# Patient Record
Sex: MALE | Race: BLACK OR AFRICAN AMERICAN | Employment: UNEMPLOYED | URBAN - NONMETROPOLITAN AREA
[De-identification: names, ages, dates, MRNs, and addresses within clinical notes are randomized per-mention and may not be internally consistent; named-entity substitution may affect disease eponyms.]

---

## 2020-06-18 ENCOUNTER — HOSPITAL ENCOUNTER (EMERGENCY)
Age: 30
Discharge: HOME | End: 2020-06-18

## 2020-06-18 VITALS
BODY MASS INDEX: 24.43 KG/M2 | HEART RATE: 101 BPM | DIASTOLIC BLOOD PRESSURE: 77 MMHG | SYSTOLIC BLOOD PRESSURE: 121 MMHG | OXYGEN SATURATION: 96 % | TEMPERATURE: 98.2 F | RESPIRATION RATE: 20 BRPM | WEIGHT: 152 LBS | HEIGHT: 66 IN

## 2020-06-18 PROCEDURE — 99282 EMERGENCY DEPT VISIT SF MDM: CPT

## 2020-06-18 RX ORDER — BUPIVACAINE HYDROCHLORIDE 5 MG/ML
30 INJECTION, SOLUTION EPIDURAL; INTRACAUDAL ONCE
Status: DISCONTINUED | OUTPATIENT
Start: 2020-06-18 | End: 2020-06-18 | Stop reason: HOSPADM

## 2020-06-18 RX ORDER — PENICILLIN V POTASSIUM 500 MG/1
500 TABLET ORAL 3 TIMES DAILY
Qty: 30 TABLET | Refills: 0 | Status: SHIPPED | OUTPATIENT
Start: 2020-06-18 | End: 2020-06-28

## 2020-06-18 RX ORDER — NAPROXEN 500 MG/1
500 TABLET ORAL 2 TIMES DAILY
Qty: 20 TABLET | Refills: 0 | Status: SHIPPED | OUTPATIENT
Start: 2020-06-18 | End: 2021-10-08

## 2020-06-18 RX ORDER — HYDROCODONE BITARTRATE AND ACETAMINOPHEN 5; 325 MG/1; MG/1
1 TABLET ORAL EVERY 6 HOURS PRN
Qty: 10 TABLET | Refills: 0 | Status: SHIPPED | OUTPATIENT
Start: 2020-06-18 | End: 2020-06-21

## 2020-06-18 RX ORDER — LIDOCAINE HYDROCHLORIDE 10 MG/ML
5 INJECTION, SOLUTION EPIDURAL; INFILTRATION; INTRACAUDAL; PERINEURAL ONCE
Status: DISCONTINUED | OUTPATIENT
Start: 2020-06-18 | End: 2020-06-18 | Stop reason: HOSPADM

## 2020-06-18 SDOH — HEALTH STABILITY: MENTAL HEALTH: HOW OFTEN DO YOU HAVE A DRINK CONTAINING ALCOHOL?: NEVER

## 2020-06-18 ASSESSMENT — ENCOUNTER SYMPTOMS: VOMITING: 0

## 2020-06-18 ASSESSMENT — PAIN SCALES - GENERAL
PAINLEVEL_OUTOF10: 10
PAINLEVEL_OUTOF10: 4

## 2020-06-18 ASSESSMENT — PAIN DESCRIPTION - ORIENTATION: ORIENTATION: LEFT

## 2020-06-18 ASSESSMENT — PAIN DESCRIPTION - PAIN TYPE: TYPE: ACUTE PAIN

## 2020-06-18 ASSESSMENT — PAIN DESCRIPTION - LOCATION: LOCATION: TEETH

## 2020-06-18 NOTE — ED NOTES
Dental block performed by Nitish Tadeo NP using bupivicane and lidocaine. Medications disposed of by NP after using before being scanned.      [facility]  06/18/20 7597

## 2021-02-22 PROCEDURE — U0004 COV-19 TEST NON-CDC HGH THRU: HCPCS | Performed by: NURSE PRACTITIONER

## 2021-05-09 ENCOUNTER — APPOINTMENT (OUTPATIENT)
Dept: CT IMAGING | Age: 31
End: 2021-05-09

## 2021-05-09 ENCOUNTER — HOSPITAL ENCOUNTER (EMERGENCY)
Age: 31
Discharge: HOME | End: 2021-05-09

## 2021-05-09 VITALS
BODY MASS INDEX: 24.43 KG/M2 | HEART RATE: 88 BPM | DIASTOLIC BLOOD PRESSURE: 71 MMHG | WEIGHT: 152 LBS | SYSTOLIC BLOOD PRESSURE: 107 MMHG | HEIGHT: 66 IN | OXYGEN SATURATION: 98 % | TEMPERATURE: 97.6 F | RESPIRATION RATE: 17 BRPM

## 2021-05-09 DIAGNOSIS — M54.10 RADICULOPATHY AFFECTING UPPER EXTREMITY: Primary | ICD-10-CM

## 2021-05-09 LAB
ALBUMIN SERPL-MCNC: 4 G/DL (ref 3.5–5.2)
ALP BLD-CCNC: 75 U/L (ref 40–130)
ALT SERPL-CCNC: 9 U/L (ref 5–41)
ANION GAP SERPL CALCULATED.3IONS-SCNC: 9 MMOL/L (ref 7–19)
AST SERPL-CCNC: 16 U/L (ref 5–40)
BILIRUB SERPL-MCNC: <0.2 MG/DL (ref 0.2–1.2)
BUN BLDV-MCNC: 16 MG/DL (ref 6–20)
CALCIUM SERPL-MCNC: 9 MG/DL (ref 8.6–10)
CHLORIDE BLD-SCNC: 106 MMOL/L (ref 98–111)
CO2: 26 MMOL/L (ref 22–29)
CREAT SERPL-MCNC: 0.9 MG/DL (ref 0.5–1.2)
GFR AFRICAN AMERICAN: >59
GFR NON-AFRICAN AMERICAN: >60
GLUCOSE BLD-MCNC: 98 MG/DL (ref 74–109)
POTASSIUM REFLEX MAGNESIUM: 4.3 MMOL/L (ref 3.5–5)
SODIUM BLD-SCNC: 141 MMOL/L (ref 136–145)
TOTAL PROTEIN: 7.1 G/DL (ref 6.6–8.7)

## 2021-05-09 PROCEDURE — 99283 EMERGENCY DEPT VISIT LOW MDM: CPT

## 2021-05-09 PROCEDURE — 72125 CT NECK SPINE W/O DYE: CPT

## 2021-05-09 PROCEDURE — 36415 COLL VENOUS BLD VENIPUNCTURE: CPT

## 2021-05-09 PROCEDURE — 70450 CT HEAD/BRAIN W/O DYE: CPT

## 2021-05-09 PROCEDURE — 80053 COMPREHEN METABOLIC PANEL: CPT

## 2021-05-09 RX ORDER — METHYLPREDNISOLONE 4 MG/1
TABLET ORAL
Qty: 1 KIT | Refills: 0 | Status: SHIPPED | OUTPATIENT
Start: 2021-05-09 | End: 2021-05-15

## 2021-05-09 ASSESSMENT — ENCOUNTER SYMPTOMS
APNEA: 0
EYE DISCHARGE: 0
SHORTNESS OF BREATH: 0
COUGH: 0
BACK PAIN: 0
COLOR CHANGE: 0
EYE ITCHING: 0
PHOTOPHOBIA: 0

## 2021-05-09 NOTE — ED PROVIDER NOTES
and dental problem. Eyes: Negative for photophobia, discharge and itching. Respiratory: Negative for apnea, cough and shortness of breath. Cardiovascular: Negative for chest pain. Musculoskeletal: Positive for arthralgias. Negative for back pain, gait problem, myalgias and neck pain. Skin: Negative for color change, pallor and rash. Neurological: Positive for weakness and numbness. Negative for dizziness, seizures and syncope. Psychiatric/Behavioral: Negative for agitation. The patient is not nervous/anxious. Except as noted above the remainder of the review of systems was reviewed and negative. PAST MEDICAL HISTORY   History reviewed. No pertinent past medical history. SURGICAL HISTORY       Past Surgical History:   Procedure Laterality Date    HAND SURGERY  2013/2014         CURRENT MEDICATIONS       Discharge Medication List as of 5/9/2021  8:42 PM      CONTINUE these medications which have NOT CHANGED    Details   naproxen (NAPROSYN) 500 MG tablet Take 1 tablet by mouth 2 times daily for 10 days, Disp-20 tablet, R-0Print             ALLERGIES     Patient has no known allergies. FAMILY HISTORY     History reviewed. No pertinent family history. SOCIAL HISTORY       Social History     Socioeconomic History    Marital status: Single     Spouse name: None    Number of children: None    Years of education: None    Highest education level: None   Occupational History    None   Social Needs    Financial resource strain: None    Food insecurity     Worry: None     Inability: None    Transportation needs     Medical: None     Non-medical: None   Tobacco Use    Smoking status: Current Some Day Smoker     Types: Cigarettes    Smokeless tobacco: Never Used   Substance and Sexual Activity    Alcohol use: Yes     Frequency: Never     Comment:  Occ    Drug use: Never    Sexual activity: None   Lifestyle    Physical activity     Days per week: None     Minutes per session: None    Stress: None   Relationships    Social connections     Talks on phone: None     Gets together: None     Attends Jew service: None     Active member of club or organization: None     Attends meetings of clubs or organizations: None     Relationship status: None    Intimate partner violence     Fear of current or ex partner: None     Emotionally abused: None     Physically abused: None     Forced sexual activity: None   Other Topics Concern    None   Social History Narrative    None       SCREENINGS           PHYSICAL EXAM    (up to 7 forlevel 4, 8 or more for level 5)     ED Triage Vitals [05/09/21 1804]   BP Temp Temp src Pulse Resp SpO2 Height Weight   101/75 97.6 °F (36.4 °C) -- 87 20 98 % 5' 6\" (1.676 m) 152 lb (68.9 kg)       Physical Exam  Vitals signs and nursing note reviewed. Constitutional:       General: He is not in acute distress. Appearance: Normal appearance. He is well-developed. He is not diaphoretic. HENT:      Head: Normocephalic and atraumatic. Right Ear: Tympanic membrane, ear canal and external ear normal.      Left Ear: Tympanic membrane, ear canal and external ear normal.      Nose: Nose normal.      Mouth/Throat:      Mouth: Mucous membranes are moist.   Eyes:      Pupils: Pupils are equal, round, and reactive to light. Neck:      Musculoskeletal: Normal range of motion and neck supple. Trachea: No tracheal deviation. Cardiovascular:      Rate and Rhythm: Normal rate and regular rhythm. Pulses: Normal pulses. Heart sounds: Normal heart sounds. No murmur. Pulmonary:      Effort: Pulmonary effort is normal.      Breath sounds: Normal breath sounds. No stridor. No wheezing. Chest:      Chest wall: No tenderness. Abdominal:      General: Abdomen is flat. Bowel sounds are normal. There is no distension. Palpations: Abdomen is soft. Tenderness: There is no abdominal tenderness. Musculoskeletal: Normal range of motion. General: Tenderness present. No signs of injury. Skin:     General: Skin is warm and dry. Capillary Refill: Capillary refill takes less than 2 seconds. Neurological:      General: No focal deficit present. Mental Status: He is alert and oriented to person, place, and time. Mental status is at baseline. Sensory: No sensory deficit. Motor: No weakness. Comments: At rest normal sensation   Psychiatric:         Mood and Affect: Mood normal.         Behavior: Behavior normal.         Thought Content: Thought content normal.         Judgment: Judgment normal.           DIAGNOSTIC RESULTS     RADIOLOGY:   Non-plain film images such as CT, Ultrasound and MRI are read by the radiologist. Plain radiographic images are visualized and preliminarilyinterpreted by No att. providers found with the below findings:      Interpretation per the Radiologist below, if available at the time of this note:    CT Head WO Contrast   Final Result   1. No acute intracranial abnormality is seen. Signed by Dr Deanna Tejada on 5/9/2021 7:20 PM      CT Cervical Spine WO Contrast   Final Result   1. No acute bony abnormality. Signed by Dr Deanna Tejada on 5/9/2021 7:22 PM          LABS:  Labs Reviewed   COMPREHENSIVE METABOLIC PANEL W/ REFLEX TO MG FOR LOW K       All other labs were within normal range or notreturned as of this dictation. RE-ASSESSMENT        EMERGENCY DEPARTMENT COURSE and DIFFERENTIAL DIAGNOSIS/MDM:   Vitals:    Vitals:    05/09/21 1804 05/09/21 2027   BP: 101/75 107/71   Pulse: 87 88   Resp: 20 17   Temp: 97.6 °F (36.4 °C)    SpO2: 98% 98%   Weight: 152 lb (68.9 kg)    Height: 5' 6\" (1.676 m)        MDM  Patient has no acute findings on head or neck. His calcium is within normal limits.   I will provide him with work excuse I given him number for [id number] care to follow-up on this issue would consider therapy it sounds like overuse mechanism it is made worse when doing repetitive movements and sleeping we feel it correlates with his neck. He has no weakness on my exam and the sensory issue is not present in ED. He has palpable pulses no vascular concerns warm extremities. No other issue this very localized. Patient states understanding we will work on discharge. PROCEDURES:    Procedures      FINAL IMPRESSION      1. Radiculopathy affecting upper extremity          DISPOSITION/PLAN   DISPOSITION Decision To Discharge 05/09/2021 08:41:59 PM      PATIENT REFERRED TO:  [address] [place], [place] [address]  [facility]  [phone]    If symptoms worsen    Luis E   Justus Ruben [id number] [phone]          DISCHARGE MEDICATIONS:  Discharge Medication List as of 5/9/2021  8:42 PM      START taking these medications    Details   methylPREDNISolone (MEDROL, DIALLO,) 4 MG tablet Take by mouth., Disp-1 kit, R-0Print             (Please note that portions of this note were completed with a voice recognition program.  Efforts were made to edit the dictations but occasionallywords are mis-transcribed.)    Richard Jones [address] [place] [address]  05/10/21 1610

## 2021-10-08 ENCOUNTER — HOSPITAL ENCOUNTER (EMERGENCY)
Facility: HOSPITAL | Age: 31
Discharge: LEFT BEFORE DISCHARGE | End: 2021-10-08
Admitting: EMERGENCY MEDICINE

## 2021-10-08 ENCOUNTER — APPOINTMENT (OUTPATIENT)
Dept: GENERAL RADIOLOGY | Facility: HOSPITAL | Age: 31
End: 2021-10-08

## 2021-10-08 ENCOUNTER — HOSPITAL ENCOUNTER (EMERGENCY)
Age: 31
Discharge: HOME | End: 2021-10-08
Attending: EMERGENCY MEDICINE
Payer: MEDICAID

## 2021-10-08 ENCOUNTER — HOSPITAL ENCOUNTER (EMERGENCY)
Facility: HOSPITAL | Age: 31
Discharge: LEFT BEFORE DISCHARGE | End: 2021-10-08

## 2021-10-08 ENCOUNTER — HOSPITAL ENCOUNTER (EMERGENCY)
Facility: HOSPITAL | Age: 31
Discharge: HOME | End: 2021-10-08
Admitting: EMERGENCY MEDICINE

## 2021-10-08 VITALS
BODY MASS INDEX: 24.43 KG/M2 | DIASTOLIC BLOOD PRESSURE: 70 MMHG | WEIGHT: 152 LBS | HEART RATE: 71 BPM | SYSTOLIC BLOOD PRESSURE: 91 MMHG | TEMPERATURE: 97.8 F | HEIGHT: 66 IN | RESPIRATION RATE: 20 BRPM | OXYGEN SATURATION: 96 %

## 2021-10-08 VITALS
HEIGHT: 65 IN | WEIGHT: 150 LBS | OXYGEN SATURATION: 100 % | HEART RATE: 98 BPM | SYSTOLIC BLOOD PRESSURE: 140 MMHG | DIASTOLIC BLOOD PRESSURE: 78 MMHG | TEMPERATURE: 98.1 F | BODY MASS INDEX: 24.99 KG/M2 | RESPIRATION RATE: 20 BRPM

## 2021-10-08 VITALS — TEMPERATURE: 98.4 F | HEIGHT: 65 IN | RESPIRATION RATE: 18 BRPM | BODY MASS INDEX: 21.63 KG/M2

## 2021-10-08 DIAGNOSIS — R07.9 CHEST PAIN, UNSPECIFIED TYPE: Primary | ICD-10-CM

## 2021-10-08 DIAGNOSIS — F15.10 METHAMPHETAMINE ABUSE (HCC): Primary | ICD-10-CM

## 2021-10-08 DIAGNOSIS — R41.0 DELIRIUM: Primary | ICD-10-CM

## 2021-10-08 DIAGNOSIS — F19.10 DRUG ABUSE (HCC): ICD-10-CM

## 2021-10-08 DIAGNOSIS — R82.5 POSITIVE URINE DRUG SCREEN: ICD-10-CM

## 2021-10-08 DIAGNOSIS — Z53.21 ELOPED FROM EMERGENCY DEPARTMENT: ICD-10-CM

## 2021-10-08 DIAGNOSIS — F12.10 MARIJUANA ABUSE: ICD-10-CM

## 2021-10-08 LAB
ALBUMIN SERPL-MCNC: 4.4 G/DL (ref 3.5–5.2)
ALBUMIN SERPL-MCNC: 4.5 G/DL (ref 3.5–5.2)
ALBUMIN/GLOB SERPL: 1.4 G/DL
ALP BLD-CCNC: 59 U/L (ref 40–130)
ALP SERPL-CCNC: 60 U/L (ref 39–117)
ALT SERPL W P-5'-P-CCNC: 11 U/L (ref 1–41)
ALT SERPL-CCNC: 14 U/L (ref 5–41)
AMPHET+METHAMPHET UR QL: POSITIVE
AMPHETAMINES UR QL: POSITIVE
ANION GAP SERPL CALCULATED.3IONS-SCNC: 10 MMOL/L (ref 7–19)
ANION GAP SERPL CALCULATED.3IONS-SCNC: 12 MMOL/L (ref 5–15)
AST SERPL-CCNC: 20 U/L (ref 1–40)
AST SERPL-CCNC: 21 U/L (ref 5–40)
BARBITURATES UR QL SCN: NEGATIVE
BASOPHILS # BLD AUTO: 0.04 10*3/MM3 (ref 0–0.2)
BASOPHILS ABSOLUTE: 0 K/UL (ref 0–0.2)
BASOPHILS NFR BLD AUTO: 0.4 % (ref 0–1.5)
BASOPHILS RELATIVE PERCENT: 0.3 % (ref 0–1)
BENZODIAZ UR QL SCN: NEGATIVE
BILIRUB SERPL-MCNC: 0.6 MG/DL (ref 0–1.2)
BILIRUB SERPL-MCNC: 0.9 MG/DL (ref 0.2–1.2)
BUN BLDV-MCNC: 16 MG/DL (ref 6–20)
BUN SERPL-MCNC: 13 MG/DL (ref 6–20)
BUN/CREAT SERPL: 14.6 (ref 7–25)
BUPRENORPHINE SERPL-MCNC: NEGATIVE NG/ML
CALCIUM SERPL-MCNC: 10 MG/DL (ref 8.6–10)
CALCIUM SPEC-SCNC: 9.7 MG/DL (ref 8.6–10.5)
CANNABINOIDS SERPL QL: POSITIVE
CHLORIDE BLD-SCNC: 102 MMOL/L (ref 98–111)
CHLORIDE SERPL-SCNC: 102 MMOL/L (ref 98–107)
CO2 SERPL-SCNC: 23 MMOL/L (ref 22–29)
CO2: 27 MMOL/L (ref 22–29)
COCAINE UR QL: NEGATIVE
CREAT SERPL-MCNC: 0.89 MG/DL (ref 0.76–1.27)
CREAT SERPL-MCNC: 1 MG/DL (ref 0.5–1.2)
DEPRECATED RDW RBC AUTO: 44.5 FL (ref 37–54)
EOSINOPHIL # BLD AUTO: 0 10*3/MM3 (ref 0–0.4)
EOSINOPHIL NFR BLD AUTO: 0 % (ref 0.3–6.2)
EOSINOPHILS ABSOLUTE: 0 K/UL (ref 0–0.6)
EOSINOPHILS RELATIVE PERCENT: 0.1 % (ref 0–5)
ERYTHROCYTE [DISTWIDTH] IN BLOOD BY AUTOMATED COUNT: 13.2 % (ref 12.3–15.4)
ETHANOL UR QL: <0.01 %
ETHANOL: <10 MG/DL (ref 0–0.08)
GFR AFRICAN AMERICAN: >59
GFR NON-AFRICAN AMERICAN: >60
GFR SERPL CREATININE-BSD FRML MDRD: 121 ML/MIN/1.73
GLOBULIN UR ELPH-MCNC: 3.1 GM/DL
GLUCOSE BLD-MCNC: 104 MG/DL (ref 74–109)
GLUCOSE SERPL-MCNC: 108 MG/DL (ref 65–99)
HCT VFR BLD AUTO: 45 % (ref 37.5–51)
HCT VFR BLD CALC: 46.6 % (ref 42–52)
HEMOGLOBIN: 15.8 G/DL (ref 14–18)
HGB BLD-MCNC: 15.4 G/DL (ref 13–17.7)
IMM GRANULOCYTES # BLD AUTO: 0.03 10*3/MM3 (ref 0–0.05)
IMM GRANULOCYTES NFR BLD AUTO: 0.3 % (ref 0–0.5)
IMMATURE GRANULOCYTES #: 0 K/UL
LYMPHOCYTES # BLD AUTO: 1.2 10*3/MM3 (ref 0.7–3.1)
LYMPHOCYTES ABSOLUTE: 1.8 K/UL (ref 1.1–4.5)
LYMPHOCYTES NFR BLD AUTO: 12.7 % (ref 19.6–45.3)
LYMPHOCYTES RELATIVE PERCENT: 17.9 % (ref 20–40)
MAGNESIUM: 1.9 MG/DL (ref 1.6–2.6)
MCH RBC QN AUTO: 31.1 PG (ref 26.6–33)
MCH RBC QN AUTO: 31.6 PG (ref 27–31)
MCHC RBC AUTO-ENTMCNC: 33.9 G/DL (ref 33–37)
MCHC RBC AUTO-ENTMCNC: 34.2 G/DL (ref 31.5–35.7)
MCV RBC AUTO: 90.9 FL (ref 79–97)
MCV RBC AUTO: 93.2 FL (ref 80–94)
METHADONE UR QL SCN: NEGATIVE
MONOCYTES # BLD AUTO: 0.79 10*3/MM3 (ref 0.1–0.9)
MONOCYTES ABSOLUTE: 0.9 K/UL (ref 0–0.9)
MONOCYTES NFR BLD AUTO: 8.4 % (ref 5–12)
MONOCYTES RELATIVE PERCENT: 9.4 % (ref 0–10)
NEUTROPHILS ABSOLUTE: 7.1 K/UL (ref 1.5–7.5)
NEUTROPHILS NFR BLD AUTO: 7.36 10*3/MM3 (ref 1.7–7)
NEUTROPHILS NFR BLD AUTO: 78.2 % (ref 42.7–76)
NEUTROPHILS RELATIVE PERCENT: 71.9 % (ref 50–65)
NRBC BLD AUTO-RTO: 0 /100 WBC (ref 0–0.2)
OPIATES UR QL: NEGATIVE
OXYCODONE UR QL SCN: NEGATIVE
PCP UR QL SCN: NEGATIVE
PDW BLD-RTO: 13 % (ref 11.5–14.5)
PLATELET # BLD AUTO: 197 10*3/MM3 (ref 140–450)
PLATELET # BLD: 184 K/UL (ref 130–400)
PMV BLD AUTO: 11.2 FL (ref 6–12)
PMV BLD AUTO: 11.2 FL (ref 9.4–12.4)
POTASSIUM REFLEX MAGNESIUM: 3.4 MMOL/L (ref 3.5–5)
POTASSIUM SERPL-SCNC: 3.4 MMOL/L (ref 3.5–5.2)
PRO-BNP: 14 PG/ML (ref 0–450)
PROPOXYPH UR QL: NEGATIVE
PROT SERPL-MCNC: 7.5 G/DL (ref 6–8.5)
RBC # BLD AUTO: 4.95 10*6/MM3 (ref 4.14–5.8)
RBC # BLD: 5 M/UL (ref 4.7–6.1)
SODIUM BLD-SCNC: 139 MMOL/L (ref 136–145)
SODIUM SERPL-SCNC: 137 MMOL/L (ref 136–145)
TOTAL PROTEIN: 7.9 G/DL (ref 6.6–8.7)
TRICYCLICS UR QL SCN: NEGATIVE
TROPONIN T SERPL-MCNC: <0.01 NG/ML (ref 0–0.03)
TROPONIN: <0.01 NG/ML (ref 0–0.03)
WBC # BLD AUTO: 9.42 10*3/MM3 (ref 3.4–10.8)
WBC # BLD: 9.9 K/UL (ref 4.8–10.8)

## 2021-10-08 PROCEDURE — 82077 ASSAY SPEC XCP UR&BREATH IA: CPT | Performed by: NURSE PRACTITIONER

## 2021-10-08 PROCEDURE — 36415 COLL VENOUS BLD VENIPUNCTURE: CPT

## 2021-10-08 PROCEDURE — 80306 DRUG TEST PRSMV INSTRMNT: CPT | Performed by: NURSE PRACTITIONER

## 2021-10-08 PROCEDURE — 82077 ASSAY SPEC XCP UR&BREATH IA: CPT

## 2021-10-08 PROCEDURE — 93005 ELECTROCARDIOGRAM TRACING: CPT | Performed by: NURSE PRACTITIONER

## 2021-10-08 PROCEDURE — 99284 EMERGENCY DEPT VISIT MOD MDM: CPT

## 2021-10-08 PROCEDURE — 83880 ASSAY OF NATRIURETIC PEPTIDE: CPT

## 2021-10-08 PROCEDURE — 6360000002 HC RX W HCPCS: Performed by: EMERGENCY MEDICINE

## 2021-10-08 PROCEDURE — 85025 COMPLETE CBC W/AUTO DIFF WBC: CPT

## 2021-10-08 PROCEDURE — 71045 X-RAY EXAM CHEST 1 VIEW: CPT

## 2021-10-08 PROCEDURE — 99211 OFF/OP EST MAY X REQ PHY/QHP: CPT

## 2021-10-08 PROCEDURE — 99281 EMR DPT VST MAYX REQ PHY/QHP: CPT

## 2021-10-08 PROCEDURE — 96374 THER/PROPH/DIAG INJ IV PUSH: CPT

## 2021-10-08 PROCEDURE — 85025 COMPLETE CBC W/AUTO DIFF WBC: CPT | Performed by: NURSE PRACTITIONER

## 2021-10-08 PROCEDURE — 93010 ELECTROCARDIOGRAM REPORT: CPT | Performed by: INTERNAL MEDICINE

## 2021-10-08 PROCEDURE — 99283 EMERGENCY DEPT VISIT LOW MDM: CPT

## 2021-10-08 PROCEDURE — 80053 COMPREHEN METABOLIC PANEL: CPT

## 2021-10-08 PROCEDURE — 80053 COMPREHEN METABOLIC PANEL: CPT | Performed by: NURSE PRACTITIONER

## 2021-10-08 PROCEDURE — 83735 ASSAY OF MAGNESIUM: CPT

## 2021-10-08 PROCEDURE — 84484 ASSAY OF TROPONIN QUANT: CPT | Performed by: NURSE PRACTITIONER

## 2021-10-08 PROCEDURE — 84484 ASSAY OF TROPONIN QUANT: CPT

## 2021-10-08 RX ORDER — SODIUM CHLORIDE 0.9 % (FLUSH) 0.9 %
10 SYRINGE (ML) INJECTION AS NEEDED
Status: DISCONTINUED | OUTPATIENT
Start: 2021-10-08 | End: 2021-10-08 | Stop reason: HOSPADM

## 2021-10-08 RX ORDER — LORAZEPAM 2 MG/ML
2 INJECTION INTRAMUSCULAR ONCE
Status: COMPLETED | OUTPATIENT
Start: 2021-10-08 | End: 2021-10-08

## 2021-10-08 RX ADMIN — LORAZEPAM 2 MG: 2 INJECTION INTRAMUSCULAR; INTRAVENOUS at 15:39

## 2021-10-08 ASSESSMENT — PAIN SCALES - GENERAL: PAINLEVEL_OUTOF10: 3
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
## 2021-10-08 NOTE — ED NOTES
Alicia Mcgrath at triage desk and request patient to be brought straight back to speak with patient.      Rayne Oliveira RN  10/08/21 2485
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
## 2021-10-08 NOTE — ED PROVIDER NOTES
"Subjective   Patient is a 31-year-old male presents the emergency department with complaints of \"I believe I have been poisoned.\"  He was seen earlier by this provider however patient got up and eloped from the emergency department.  He appears to be under the influence of a substance.  He does not make much sense on exam.  He complains of memory loss and chest pain and nausea however does not stay focused and answer questions appropriately.  He is somewhat agitated. We have needed security in the room while we are examining the patient.  Earlier he continued to get up and walk outside the room into the hallway and he was noted to hit the walls.  He is tearful at times.  Currently he is staying in the bed and allowing us to do a work-up on him.  Limited information given his status.          Review of Systems   Constitutional: Negative.  Negative for fever.   HENT: Negative.  Negative for congestion.    Eyes: Negative.    Respiratory: Positive for shortness of breath. Negative for cough.    Cardiovascular: Positive for chest pain.   Gastrointestinal: Positive for nausea. Negative for abdominal pain, constipation, diarrhea and vomiting.   Genitourinary: Negative.    Musculoskeletal: Negative.    Skin: Negative.    Psychiatric/Behavioral: Positive for agitation. Negative for hallucinations. The patient is nervous/anxious.    All other systems reviewed and are negative.      No past medical history on file.    No Known Allergies    Past Surgical History:   Procedure Laterality Date   • HAND SURGERY         No family history on file.    Social History     Socioeconomic History   • Marital status: Single     Spouse name: Not on file   • Number of children: Not on file   • Years of education: Not on file   • Highest education level: Not on file   Tobacco Use   • Smoking status: Current Every Day Smoker     Packs/day: 0.50     Types: Cigarettes   • Smokeless tobacco: Never Used   Substance and Sexual Activity   • Alcohol " use: Never   • Drug use: Never           Objective   Physical Exam  Vitals and nursing note reviewed.   Constitutional:       General: He is not in acute distress.     Appearance: Normal appearance. He is well-developed. He is not diaphoretic.   HENT:      Head: Atraumatic.      Right Ear: External ear normal.      Left Ear: External ear normal.      Nose: Nose normal.      Mouth/Throat:      Mouth: Mucous membranes are moist.      Pharynx: Oropharynx is clear.   Eyes:      General: No scleral icterus.     Extraocular Movements: Extraocular movements intact.      Conjunctiva/sclera: Conjunctivae normal.      Pupils: Pupils are equal, round, and reactive to light.   Neck:      Thyroid: No thyromegaly.      Vascular: No JVD.   Cardiovascular:      Rate and Rhythm: Normal rate and regular rhythm.      Heart sounds: Normal heart sounds. No murmur heard.     Pulmonary:      Effort: Pulmonary effort is normal. No respiratory distress.      Breath sounds: Normal breath sounds. No wheezing or rales.   Chest:      Chest wall: No tenderness.   Abdominal:      General: Bowel sounds are normal. There is no distension.      Palpations: Abdomen is soft. There is no mass.      Tenderness: There is no abdominal tenderness. There is no guarding or rebound.   Musculoskeletal:         General: Normal range of motion.      Cervical back: Normal range of motion and neck supple.   Lymphadenopathy:      Cervical: No cervical adenopathy.   Skin:     General: Skin is warm and dry.      Coloration: Skin is not pale.      Findings: No erythema or rash.   Neurological:      Mental Status: He is alert and oriented to person, place, and time.      Cranial Nerves: No cranial nerve deficit.      Coordination: Coordination normal.      Deep Tendon Reflexes: Reflexes are normal and symmetric.   Psychiatric:      Comments: Patient is alert and oriented, he appears to be under the influence of a substance on exam, he is tearful stating he believes  someone has poisoned him, he does not make much sense on exam, difficult to get him to answer questions appropriately         Procedures           ED Course  ED Course as of Oct 08 1520   Fri Oct 08, 2021   1142 Patient has required security in his room at all times.  He has been standing up in the room and being somewhat disruptive with the security.  His urine drug screen is positive for methamphetamines, amphetamines, and THC.  His labs are otherwise unremarkable with a normal troponin at less than 0.010.  Blood alcohol level of less than 0.010.  CBC is unremarkable, CMP is unremarkable as well.  We will discharge patient now in stable condition.  He will need to discontinue illegal drugs.    [TW]      ED Course User Index  [TW] Alicia Keen, AMINA                                           MDM  Number of Diagnoses or Management Options  Drug abuse (HCC): new and requires workup  Marijuana abuse: new and requires workup  Methamphetamine abuse (HCC): new and requires workup     Amount and/or Complexity of Data Reviewed  Clinical lab tests: ordered and reviewed  Tests in the radiology section of CPT®: ordered and reviewed  Discuss the patient with other providers: yes    Risk of Complications, Morbidity, and/or Mortality  Presenting problems: moderate  Diagnostic procedures: moderate  Management options: moderate    Patient Progress  Patient progress: improved      Final diagnoses:   Methamphetamine abuse (HCC)   Marijuana abuse   Drug abuse (HCC)       ED Disposition  ED Disposition     ED Disposition Condition Comment    Discharge Good           No follow-up provider specified.       Medication List      No changes were made to your prescriptions during this visit.          Alicia Keen, AMINA  10/08/21 152

## 2021-10-08 NOTE — ED NOTES
Patient assisted with sign in sheet per security. Ay this time it was noted that patient did not include a birth date or social security number. Patient was asked to verify one or the other so he could be found in computer system. At this time patient started saying that he was poised by drinking water. Charge nurse was notified of patient and patient behavior at triage . Charge nurse was aware of Patient  due to earlier visit. Patient name and birthday along with s.s number provided per charge nurse. Alicia Keen provider notified per charge nurse due to caring for patient earlier in the morning.      Rayne Oliveira, RN  10/08/21 2623

## 2021-10-08 NOTE — Clinical Note
Bette Weber was seen and treated in our emergency department on 10/8/2021. He may return to work on 10/11/2021. If you have any questions or concerns, please don't hesitate to call.       Maru Pham MD

## 2021-10-08 NOTE — ED NOTES
Patient signed in and heard making commotion. Security called stating that patient had left with out being seen. Triage not yet preformed.        Rayne Oliveira RN  10/08/21 0238

## 2021-10-08 NOTE — Clinical Note
Jaclyn العلي was seen and treated in our emergency department on 10/8/2021. He may return to work on 10/11/2021. If you have any questions or concerns, please don't hesitate to call.       Vitaly Armas MD

## 2021-10-10 LAB
EKG P AXIS: 3 DEGREES
EKG P-R INTERVAL: 136 MS
EKG Q-T INTERVAL: 332 MS
EKG QRS DURATION: 88 MS
EKG QTC CALCULATION (BAZETT): 419 MS
EKG T AXIS: 8 DEGREES

## 2021-10-10 PROCEDURE — 93010 ELECTROCARDIOGRAM REPORT: CPT | Performed by: INTERNAL MEDICINE

## 2021-10-11 LAB
QT INTERVAL: 372 MS
QTC INTERVAL: 393 MS

## 2021-11-24 ENCOUNTER — HOSPITAL ENCOUNTER (EMERGENCY)
Age: 31
Discharge: HOME | End: 2021-11-24
Attending: PEDIATRICS
Payer: MEDICAID

## 2021-11-24 VITALS
OXYGEN SATURATION: 97 % | SYSTOLIC BLOOD PRESSURE: 128 MMHG | HEIGHT: 66 IN | HEART RATE: 121 BPM | WEIGHT: 132 LBS | RESPIRATION RATE: 18 BRPM | TEMPERATURE: 97.8 F | DIASTOLIC BLOOD PRESSURE: 76 MMHG | BODY MASS INDEX: 21.21 KG/M2

## 2021-11-24 DIAGNOSIS — F19.90 SUBSTANCE USE DISORDER: Primary | ICD-10-CM

## 2021-11-24 LAB
ACETAMINOPHEN LEVEL: <15 UG/ML
ALBUMIN SERPL-MCNC: 4.4 G/DL (ref 3.5–5.2)
ALP BLD-CCNC: 65 U/L (ref 40–130)
ALT SERPL-CCNC: 15 U/L (ref 5–41)
AMPHETAMINE SCREEN, URINE: POSITIVE
ANION GAP SERPL CALCULATED.3IONS-SCNC: 12 MMOL/L (ref 7–19)
AST SERPL-CCNC: 22 U/L (ref 5–40)
BACTERIA: NEGATIVE /HPF
BARBITURATE SCREEN URINE: NEGATIVE
BASOPHILS ABSOLUTE: 0.1 K/UL (ref 0–0.2)
BASOPHILS RELATIVE PERCENT: 0.4 % (ref 0–1)
BENZODIAZEPINE SCREEN, URINE: NEGATIVE
BILIRUB SERPL-MCNC: 0.7 MG/DL (ref 0.2–1.2)
BILIRUBIN URINE: NEGATIVE
BLOOD, URINE: NEGATIVE
BUN BLDV-MCNC: 17 MG/DL (ref 6–20)
C. TRACHOMATIS DNA ,URINE: NEGATIVE
CALCIUM SERPL-MCNC: 9.2 MG/DL (ref 8.6–10)
CANNABINOID SCREEN URINE: POSITIVE
CHLORIDE BLD-SCNC: 100 MMOL/L (ref 98–111)
CLARITY: CLEAR
CO2: 24 MMOL/L (ref 22–29)
COCAINE METABOLITE SCREEN URINE: POSITIVE
COLOR: YELLOW
CREAT SERPL-MCNC: 1 MG/DL (ref 0.5–1.2)
CRYSTALS, UA: ABNORMAL /HPF
EOSINOPHILS ABSOLUTE: 0 K/UL (ref 0–0.6)
EOSINOPHILS RELATIVE PERCENT: 0.1 % (ref 0–5)
EPITHELIAL CELLS, UA: 2 /HPF (ref 0–5)
ETHANOL: <10 MG/DL (ref 0–0.08)
GFR AFRICAN AMERICAN: >59
GFR NON-AFRICAN AMERICAN: >60
GLUCOSE BLD-MCNC: 116 MG/DL (ref 74–109)
GLUCOSE URINE: NEGATIVE MG/DL
HCT VFR BLD CALC: 45.1 % (ref 42–52)
HEMOGLOBIN: 14.8 G/DL (ref 14–18)
HYALINE CASTS: 23 /HPF (ref 0–8)
IMMATURE GRANULOCYTES #: 0.1 K/UL
KETONES, URINE: 40 MG/DL
LEUKOCYTE ESTERASE, URINE: ABNORMAL
LYMPHOCYTES ABSOLUTE: 1.8 K/UL (ref 1.1–4.5)
LYMPHOCYTES RELATIVE PERCENT: 14.4 % (ref 20–40)
Lab: ABNORMAL
MCH RBC QN AUTO: 30.9 PG (ref 27–31)
MCHC RBC AUTO-ENTMCNC: 32.8 G/DL (ref 33–37)
MCV RBC AUTO: 94.2 FL (ref 80–94)
MONOCYTES ABSOLUTE: 1.2 K/UL (ref 0–0.9)
MONOCYTES RELATIVE PERCENT: 9.6 % (ref 0–10)
N. GONORRHOEAE DNA, URINE: NEGATIVE
NEUTROPHILS ABSOLUTE: 9.5 K/UL (ref 1.5–7.5)
NEUTROPHILS RELATIVE PERCENT: 75 % (ref 50–65)
NITRITE, URINE: NEGATIVE
OPIATE SCREEN URINE: NEGATIVE
PDW BLD-RTO: 13.5 % (ref 11.5–14.5)
PH UA: 6 (ref 5–8)
PLATELET # BLD: 210 K/UL (ref 130–400)
PMV BLD AUTO: 10.4 FL (ref 9.4–12.4)
POTASSIUM SERPL-SCNC: 3.8 MMOL/L (ref 3.5–5)
PROTEIN UA: ABNORMAL MG/DL
RBC # BLD: 4.79 M/UL (ref 4.7–6.1)
RBC UA: 2 /HPF (ref 0–4)
SALICYLATE, SERUM: <3 MG/DL (ref 3–10)
SODIUM BLD-SCNC: 136 MMOL/L (ref 136–145)
SPECIFIC GRAVITY UA: 1.01 (ref 1–1.03)
TOTAL PROTEIN: 7 G/DL (ref 6.6–8.7)
TRICHOMONAS VAGINALIS DNA, URINE: NEGATIVE
TSH REFLEX FT4: 1.63 UIU/ML (ref 0.35–5.5)
UROBILINOGEN, URINE: 0.2 E.U./DL
WBC # BLD: 12.6 K/UL (ref 4.8–10.8)
WBC UA: 21 /HPF (ref 0–5)

## 2021-11-24 PROCEDURE — 80143 DRUG ASSAY ACETAMINOPHEN: CPT

## 2021-11-24 PROCEDURE — 87086 URINE CULTURE/COLONY COUNT: CPT

## 2021-11-24 PROCEDURE — 80179 DRUG ASSAY SALICYLATE: CPT

## 2021-11-24 PROCEDURE — 80307 DRUG TEST PRSMV CHEM ANLYZR: CPT

## 2021-11-24 PROCEDURE — 81001 URINALYSIS AUTO W/SCOPE: CPT

## 2021-11-24 PROCEDURE — 87661 TRICHOMONAS VAGINALIS AMPLIF: CPT

## 2021-11-24 PROCEDURE — 87491 CHLMYD TRACH DNA AMP PROBE: CPT

## 2021-11-24 PROCEDURE — 99283 EMERGENCY DEPT VISIT LOW MDM: CPT

## 2021-11-24 PROCEDURE — 36415 COLL VENOUS BLD VENIPUNCTURE: CPT

## 2021-11-24 PROCEDURE — 82077 ASSAY SPEC XCP UR&BREATH IA: CPT

## 2021-11-24 PROCEDURE — 85025 COMPLETE CBC W/AUTO DIFF WBC: CPT

## 2021-11-24 PROCEDURE — 86592 SYPHILIS TEST NON-TREP QUAL: CPT

## 2021-11-24 PROCEDURE — 84443 ASSAY THYROID STIM HORMONE: CPT

## 2021-11-24 PROCEDURE — 87591 N.GONORRHOEAE DNA AMP PROB: CPT

## 2021-11-24 PROCEDURE — 80053 COMPREHEN METABOLIC PANEL: CPT

## 2021-11-24 NOTE — BH NOTE
[facility] EMERGENCY DEPT  PATIENT SAFETY PLAN      Pt Name: Tona Fairbanks  MRN: [id number]  [place] 1990  Date of evaluation: 11/24/2021  Provider: Nora Gonzales RN     COMPLETED WITH PATIENT BY Kaleb Verde RN    Patient refuses to complete Safety plan stating, \"I don't need it\". STEP 1:  Warning signs  (Thought, Images, Mood, Situation, Behavior) that a crisis may be developing:        STEP 2:  Internal Coping Strategies - Things I can do to take my mind off my problems without contacting another person (Relaxation Technique, Physical Activity):        STEP 3:  People and Social Settings that provide distraction:        STEP 4:  People whom I can ask for help:        STEP 5:  Professionals or Agencies I can contact during a crisis:    1. Clinician Name:  [id number] MIKI Garcia  Phone:  [phone]        Emergency Contact #:  [phone]  2. Clinician Name:  [address]  Phone:  [phone]         Emergency Contact #:  [phone]  3. Local Urgent Care Services: [facility] Emergency Department  a. Urgent Care Services Address:  [address] [place] [address], [place] [address]  b. Urgent Care Services Phone:  [phone]  4. Suicide Prevention Lifeline Phone:  [phone] ([phone])      STEP 6:  Making the environment Safe:    1. Remove weapons from the home  2.  Remove extra medications from the home    THE ONE THING THAT IS MOST IMPORTANT TO ME AND WORTH LIVING FOR IS: 6

## 2021-11-24 NOTE — BH NOTE
[facility] ADULT INITIAL INTAKE ASSESSMENT     11/24/21    Mora Olivo ,a 32 y.o. male, presents to the ED for a psychiatric assessment. ED Arrival time: [facility]  ED physician: Dr. Clara Combs MD  [facility] Notification time: 1687  [facility] Assessment start time: 0230  Psychiatrist call time: 12  Spoke with Dr. Garret Calero    Patient is referred by: ambulance    Reason for visit to ED - Presenting problem:     PT states reason for ED visit, \"I started feeling weird. I ain't never tried meth before. I ate some food and I had some bubbles in my chest.  I just started feeling weird. My stomach started feeling funny. Somebody gave me a cigarette and I hit it once or twice and I realized it was weed. I am on shock probation for the last couple of months. \"  Patient denies SI, HI, and AVH at this time. Incarcerated for 3 years for theft. Patient was released from half-way in December, 2020    ED RN reports:  Patient states that someone laced his cigarette. Patient thinks that the 1:1 sitter is recording him.     Duration of symptoms: last night    Current Stressors: legal and drug and alcohol    C-SSRS Completed: yes    SI:  denies   Plan: no   Past SI attempts: no   If yes, when and how many times:  Describe suicide attempts:   HI: denies  If yes describe:   Delusions: denies  If yes describe:   Hallucinations: denies   If yes describe:   Risk of Harm to self: Self injurious/self mutilation behaviors: no   If yes explain:   Was it within the past 6 months: no   Risk of Harm to others: no   If yes explain:   Was it within the past 6 months: no     Trauma History:  none    Anxiety 1-10:  10  Explain if increased:   Depression 1-10:  7  Explain if increased:   Level of function outside hospital decreased: no   If yes explain:       Psychiatric Hospitalizations: No   Where & When:   Outpatient Psychiatric Treatment:  none    Family History:    Family history of mental illness: no   Family members with suicide attempt: no   If yes explain (attempted or completed):    Substance Abuse History:     SBIRT Completed: yes  Brief Intervention completed if needed:  (Yes/No)    Current ETOH LEVELS: < 10    ETOH Usage:     Amount drinking daily: denied    Date of last drink: 2 months ago  Longest period of sobriety:    Substance/Chemical Abuse/Recreational Drug History:  Substance used: alcohol, marijuana and cocaine  Date of last substance use: today  Tobacco Use: yes   History of rehab treatment:  [facility]  How many times in rehab: one  Last time in rehab:  2019   Family history of substance abuse:    Opiates: It was discussed with pt they would not be receiving opiates unless they were within 3 days post surgery/acute injury. Patient voiced understanding and agreed. Psychiatric Review Of Systems:     Recent Sleep changes: yes   Recent appetite changes: yes   Recent weight changes/Pounds gained (+) or lost (-): no      Medical History:     Medical Diagnosis/Issues: none  CT today in ED:no  Use of 02 or CPAP: no  Ambulatory: yes  Independent or Need assistance with Self Care: Independent    PCP: No primary care provider on file. Current Medications:   Scheduled Meds:   Current Facility-Administered Medications:     cefTRIAXone (ROCEPHIN) 1,000 mg in sterile water 10 mL IV syringe, 1,000 mg, IntraVENous, Once, Marvin Rivas MD  No current outpatient medications on file.      Mental Status Evaluation:     Appearance:  disheveled   Behavior:  Within Normal Limits   Speech:  normal pitch and normal volume   Mood:  anxious   Affect:  mood-congruent   Thought Process:  circumstantial   Thought Content:  paranoid   Sensorium:  person, place, situation, month of year and year   Cognition:  grossly intact   Insight:  limited       Collateral Information:     Name: Michael Thorpe  Relationship: brother  Phone Number: [phone]  Collateral:    Current living arrangement:  lives with brother  Current Support System:  brother  Employment: [employer]    Disposition:     Choose one of the options below for disposition:     1. Decision to admit to [facility]:  no    If yes, which unit Adult or Geriatric Unit:   Is patient voluntary:  If no has a 72 hold been initiated:   Admission Diagnosis:     Does the patient have a guardian or Medical POA:   no  Has the guardian been notified or Medical POA:       2. Decision to Discharge:   Does not meet criteria for acceptance to   unit due to: Patient is not SI, HI, AVH, psychotic, or delusional.  Patient refuses information for rehabilitation facilities.       Lyndsey Nix RN

## 2021-11-26 LAB
RPR: NORMAL
URINE CULTURE, ROUTINE: NORMAL

## 2021-12-06 ENCOUNTER — HOSPITAL ENCOUNTER (INPATIENT)
Age: 31
LOS: 3 days | Discharge: HOME | DRG: 776 | End: 2021-12-09
Attending: EMERGENCY MEDICINE | Admitting: PSYCHIATRY & NEUROLOGY
Payer: MEDICAID

## 2021-12-06 DIAGNOSIS — F22 PARANOIA (HCC): ICD-10-CM

## 2021-12-06 DIAGNOSIS — F19.10 SUBSTANCE ABUSE (HCC): Primary | ICD-10-CM

## 2021-12-06 LAB
ACETAMINOPHEN LEVEL: <15 UG/ML
ALBUMIN SERPL-MCNC: 4.8 G/DL (ref 3.5–5.2)
ALP BLD-CCNC: 72 U/L (ref 40–130)
ALT SERPL-CCNC: 14 U/L (ref 5–41)
AMPHETAMINE SCREEN, URINE: POSITIVE
ANION GAP SERPL CALCULATED.3IONS-SCNC: 13 MMOL/L (ref 7–19)
AST SERPL-CCNC: 18 U/L (ref 5–40)
BARBITURATE SCREEN URINE: NEGATIVE
BASOPHILS ABSOLUTE: 0 K/UL (ref 0–0.2)
BASOPHILS RELATIVE PERCENT: 0.4 % (ref 0–1)
BENZODIAZEPINE SCREEN, URINE: NEGATIVE
BILIRUB SERPL-MCNC: 0.4 MG/DL (ref 0.2–1.2)
BUN BLDV-MCNC: 13 MG/DL (ref 6–20)
CALCIUM SERPL-MCNC: 9.4 MG/DL (ref 8.6–10)
CANNABINOID SCREEN URINE: POSITIVE
CHLORIDE BLD-SCNC: 100 MMOL/L (ref 98–111)
CO2: 26 MMOL/L (ref 22–29)
COCAINE METABOLITE SCREEN URINE: NEGATIVE
CREAT SERPL-MCNC: 0.9 MG/DL (ref 0.5–1.2)
EOSINOPHILS ABSOLUTE: 0 K/UL (ref 0–0.6)
EOSINOPHILS RELATIVE PERCENT: 0.1 % (ref 0–5)
ETHANOL: <10 MG/DL (ref 0–0.08)
GFR AFRICAN AMERICAN: >59
GFR NON-AFRICAN AMERICAN: >60
GLUCOSE BLD-MCNC: 145 MG/DL (ref 74–109)
HCT VFR BLD CALC: 48.7 % (ref 42–52)
HEMOGLOBIN: 16.1 G/DL (ref 14–18)
IMMATURE GRANULOCYTES #: 0.1 K/UL
LYMPHOCYTES ABSOLUTE: 2 K/UL (ref 1.1–4.5)
LYMPHOCYTES RELATIVE PERCENT: 21.4 % (ref 20–40)
Lab: ABNORMAL
MCH RBC QN AUTO: 31.3 PG (ref 27–31)
MCHC RBC AUTO-ENTMCNC: 33.1 G/DL (ref 33–37)
MCV RBC AUTO: 94.6 FL (ref 80–94)
MONOCYTES ABSOLUTE: 0.8 K/UL (ref 0–0.9)
MONOCYTES RELATIVE PERCENT: 8.4 % (ref 0–10)
NEUTROPHILS ABSOLUTE: 6.6 K/UL (ref 1.5–7.5)
NEUTROPHILS RELATIVE PERCENT: 69.1 % (ref 50–65)
OPIATE SCREEN URINE: NEGATIVE
PDW BLD-RTO: 13.2 % (ref 11.5–14.5)
PLATELET # BLD: 229 K/UL (ref 130–400)
PMV BLD AUTO: 10.9 FL (ref 9.4–12.4)
POTASSIUM SERPL-SCNC: 3.4 MMOL/L (ref 3.5–5)
RBC # BLD: 5.15 M/UL (ref 4.7–6.1)
SALICYLATE, SERUM: <3 MG/DL (ref 3–10)
SARS-COV-2, NAAT: NOT DETECTED
SODIUM BLD-SCNC: 139 MMOL/L (ref 136–145)
TOTAL PROTEIN: 8 G/DL (ref 6.6–8.7)
WBC # BLD: 9.5 K/UL (ref 4.8–10.8)

## 2021-12-06 PROCEDURE — 6370000000 HC RX 637 (ALT 250 FOR IP): Performed by: EMERGENCY MEDICINE

## 2021-12-06 PROCEDURE — 80053 COMPREHEN METABOLIC PANEL: CPT

## 2021-12-06 PROCEDURE — 36415 COLL VENOUS BLD VENIPUNCTURE: CPT

## 2021-12-06 PROCEDURE — 1240000000 HC EMOTIONAL WELLNESS R&B

## 2021-12-06 PROCEDURE — 99284 EMERGENCY DEPT VISIT MOD MDM: CPT

## 2021-12-06 PROCEDURE — 6360000002 HC RX W HCPCS: Performed by: EMERGENCY MEDICINE

## 2021-12-06 PROCEDURE — 82077 ASSAY SPEC XCP UR&BREATH IA: CPT

## 2021-12-06 PROCEDURE — 85025 COMPLETE CBC W/AUTO DIFF WBC: CPT

## 2021-12-06 PROCEDURE — 87635 SARS-COV-2 COVID-19 AMP PRB: CPT

## 2021-12-06 PROCEDURE — 80143 DRUG ASSAY ACETAMINOPHEN: CPT

## 2021-12-06 PROCEDURE — 96372 THER/PROPH/DIAG INJ SC/IM: CPT

## 2021-12-06 PROCEDURE — 80179 DRUG ASSAY SALICYLATE: CPT

## 2021-12-06 PROCEDURE — 80307 DRUG TEST PRSMV CHEM ANLYZR: CPT

## 2021-12-06 RX ORDER — POTASSIUM CHLORIDE 750 MG/1
10 TABLET, EXTENDED RELEASE ORAL ONCE
Status: COMPLETED | OUTPATIENT
Start: 2021-12-06 | End: 2021-12-06

## 2021-12-06 RX ORDER — NICOTINE 21 MG/24HR
1 PATCH, TRANSDERMAL 24 HOURS TRANSDERMAL DAILY
Status: DISCONTINUED | OUTPATIENT
Start: 2021-12-06 | End: 2021-12-09 | Stop reason: HOSPADM

## 2021-12-06 RX ORDER — ACETAMINOPHEN 325 MG/1
650 TABLET ORAL EVERY 4 HOURS PRN
Status: DISCONTINUED | OUTPATIENT
Start: 2021-12-06 | End: 2021-12-09 | Stop reason: HOSPADM

## 2021-12-06 RX ORDER — TRAZODONE HYDROCHLORIDE 50 MG/1
50 TABLET ORAL NIGHTLY PRN
Status: DISCONTINUED | OUTPATIENT
Start: 2021-12-06 | End: 2021-12-09 | Stop reason: HOSPADM

## 2021-12-06 RX ORDER — HALOPERIDOL 5 MG/ML
5 INJECTION INTRAMUSCULAR ONCE
Status: COMPLETED | OUTPATIENT
Start: 2021-12-06 | End: 2021-12-06

## 2021-12-06 RX ORDER — POLYETHYLENE GLYCOL 3350 17 G/17G
17 POWDER, FOR SOLUTION ORAL DAILY PRN
Status: DISCONTINUED | OUTPATIENT
Start: 2021-12-06 | End: 2021-12-09 | Stop reason: HOSPADM

## 2021-12-06 RX ORDER — LORAZEPAM 2 MG/ML
2 INJECTION INTRAMUSCULAR ONCE
Status: COMPLETED | OUTPATIENT
Start: 2021-12-06 | End: 2021-12-06

## 2021-12-06 RX ADMIN — POTASSIUM CHLORIDE 10 MEQ: 750 TABLET, EXTENDED RELEASE ORAL at 05:07

## 2021-12-06 RX ADMIN — LORAZEPAM 2 MG: 2 INJECTION, SOLUTION INTRAMUSCULAR; INTRAVENOUS at 09:10

## 2021-12-06 RX ADMIN — HALOPERIDOL LACTATE 5 MG: 5 INJECTION, SOLUTION INTRAMUSCULAR at 09:09

## 2021-12-06 NOTE — PLAN OF CARE
Group Therapy Note     Date: 12/6/2021  Start Time: 9218  End Time:  1600  Number of Participants: 11     Type of Group: Recovery     Wellness Binder Information  Module Name:  emotional wellness  Session Number:  1     Patient's Goal:  validation of feelings     Notes:  pt was verbally prompted to attend group. Pt refused. Information about emotional wellness was provided. Status After Intervention:       Participation Level:      Participation Quality:         Speech:           Thought Process/Content:         Affective Functioning:         Mood:         Level of consciousness:          Response to Learning:         Endings:      Modes of Intervention:         Discipline Responsible: Psychoeducational Specialist        Signature:  Gus Luis

## 2021-12-06 NOTE — ED NOTES
Bed: 03  Expected date:   Expected time:   Means of arrival:   Comments:  Odilon Maynard [facility]  12/06/21 9965

## 2021-12-06 NOTE — BH NOTE
[facility] ADULT INITIAL INTAKE ASSESSMENT     12/6/21    Zurdo Hou ,a 32 y.o. male, presents to the ED for a psychiatric assessment. ED Arrival time: [facility]  ED physician: [facility] Notification time: In ED  [facility]FFIL[facility] OF [facility] Assessment start time: 0550  Psychiatrist call time: [address] with Dr. Keith Calderón    Patient is referred by: ambulance    Reason for visit to ED - Presenting problem:     PT states reason for ED visit, \"I called the ambulance. It was the best thing. They followed me here in that [employer]. Check the video tape. They tried to make me stay in that house. The only reason I smoked meth tonight is because every time I smoke a cigarette, my mouth goes numb. I know that it is laced so I did meth to see. This is only the third time that I have done it. The first two times, somebody sneaked it into me but this time I did it just to see. I know that I will be in protective custody soon. I know way too much. I need to talk to an investigator. They have weird stuff all over that house. It's booby trapped. They got people in the walls. I got something in my body or clothes. Listening devices or something. No let's hold off on the Xray. I really want to go to the police station when I get released from here. \"  Patient denies SI, HI, and AVH at this time. ED Physician reports: Zurdo Hou is a 32 y.o. male who presents to the emergency department [facility]. Patient admits that he snorted 2 lines of ice tonight. Then called EMS. Gently explained to me why he called EMS other than is concerned that if he did not he was going to be killed. He is extremely paranoid. No HI or SI. Very limited history but is speech is tangential and again he is extremely paranoid. Patient was in the ED on 11/24/2021 for the same complaints.       Duration of symptoms: Worsening over the last 2 months    Current Stressors: drug and alcohol    C-SSRS Completed: yes    SI:  denies   Plan: no   Past SI attempts: no If yes, when and how many times:  Describe suicide attempts:   HI: denies  If yes describe:   Delusions: has  If yes describe:  See above  Hallucinations: has   If yes describe: see above  Risk of Harm to self: Self injurious/self mutilation behaviors:  no   If yes explain:   Was it within the past 6 months: no   Risk of Harm to others: no   If yes explain:   Was it within the past 6 months: no    Trauma History: none  Anxiety 1-10:  10  Explain if increased:   Depression 1-10:  10  Explain if increased:   Level of function outside hospital decreased: no   If yes explain:       Psychiatric Hospitalizations: No   Where & When:   Outpatient Psychiatric Treatment:  no    Family History:    Family history of mental illness: no   Family members with suicide attempt: no   If yes explain (attempted or completed):    Substance Abuse History:     SBIRT Completed: yes  Brief Intervention completed if needed:  (Yes/No)    Current ETOH LEVELS:   < 10    ETOH Usage:     Amount drinking daily: occasional, social use    Date of last drink: Saturday  Longest period of sobriety:    Substance/Chemical Abuse/Recreational Drug History:  Substance used: marijuana and methamphetamines  Date of last substance use: today  Tobacco Use: yes   History of rehab treatment:  yes  How many times in rehab:  One time while incarcerated  ([facility])  Last time in rehab:  2019  Family history of substance abuse:  no    Opiates: It was discussed with pt they would not be receiving opiates unless they were within 3 days post surgery/acute injury. Patient voiced understanding and agreed. Psychiatric Review Of Systems:     Recent Sleep changes: yes   Recent appetite changes: yes   Recent weight changes/Pounds gained (+) or lost (-): no      Medical History:     Medical Diagnosis/Issues: none  CT today in ED:no  Use of 02 or CPAP: no  Ambulatory: yes  Independent or Need assistance with Self Care: Independent    PCP: No primary care provider on file. Current Medications:   Scheduled Meds: No current facility-administered medications for this encounter. No current outpatient medications on file. Mental Status Evaluation:     Appearance:  bearded, disheveled and tattooed   Behavior:  Restless & fidgety   Speech:  normal pitch and normal volume   Mood:  anxious and depressed   Affect:  mood-congruent   Thought Process:  tangential   Thought Content:  hallucinations   Sensorium:  person, place, time/date, situation, day of week, month of year and year   Cognition:  impaired    Insight:  limited       Collateral Information:     Name: Jarred Mercer  Relationship: mother  Phone Number: [phone]  Collateral:     Current living arrangement: live with brother and his girlfriend  Current Support System:  none  Employment: unemployed    Disposition:     Choose one of the options below for disposition:     1. Decision to admit to [facility]:  no    If yes, which unit Adult or Geriatric Unit:    Is patient voluntary:  If no has a 72 hold been initiated:   Admission Diagnosis:     Does the patient have a guardian or Medical POA:   Has the guardian been notified or Medical POA:       2. Decision to Discharge:   Does not meet criteria for acceptance to   unit due to: Patient is paranoid and presents to the ED with exact complaints as on 11/24/2021. Patient is not SI, HI, or AVH. Patient is not admitted to [facility] and offered contact information for local substance abuse rehab facilities. Patient refuses information and refuses to complete a discharge safety plan.       Dolly Sevilla RN

## 2021-12-06 NOTE — PROGRESS NOTES
[facility] Admission from ED  Nursing Admission Note             There is no problem list on file for this patient. Addictive Behavior:        Medical Problems:   No past medical history on file. Status EXAM:         Metabolic Screening:    No results found for: LABA1C  No results found for: CHOL  No results found for: TRIG  No results found for: HDL  No components found for: LDLCAL  No results found for: LABVLDL    Body mass index is 21.31 kg/m². BP Readings from Last 2 Encounters:   12/06/21 104/72   11/24/21 128/76       PATIENT STRENGTHS:       Patient Strengths and Limitations:         Tobacco Screening:  Practical Counseling, on admission, irasema X, if applicable and completed (first 3 are required if patient doesn't refuse):            Recognizing danger situations (included triggers and roadblocks)   Unable to access            Coping skills (new ways to manage stress, exercise, relaxation techniques, changing routine, distraction  Unable to access                                                    Basic information about quitting (benefits of quitting, techniques in how to quit, available resources unable to access  Referral for counseling faxed to [facility]                                          Patient refused counseling unable to access  Patient has not smoked in the last 30 days unable to access  Patient offered nicotine patch. Received   Refused   Patient is a non-smoker no. Patient is a smoker and ordered Nicotine 21 mg patch         Admission to Unit:    Pt admitted to [facility] under the care of Dr. Ulices Alvarado,  arrived on unit via [facility] with security and staff from emergency  Patient arrived dressed in paper scrubs:  yes. Body assessment and safety check completed by jen Quiroz and Dima Crane RN and  NO contraband discovered.     Patient belongings and valuables was cataloged and accounted for by jen Quiroz    Admission completed by Alexus Chandler RN  Oriented to unit, unit policy and expectations:  UNABLE TO ACCESS  Reviewed and explained all legal documents:  UNABLE TO ACCESS   Education for Fall Prevention and Restraints given: NA    Patient signed all legal documents no   Pt verbalizes understanding:NA     Corky Teresa Obtained? yes    Identifies stressors. yes   DRUGS AND ALCOHOL    COVID TEACHING: Nursing provided education regarding COVID for social distancing, wearing masks, washing hands, and reporting any symptoms: TOPHER  Mask Provided: yes If patient refused, reason:      Admission Note: Patient arrived per wheelchair to adult  Behavioral health unit. Unable to admit patient at this time. Patient paranoid and had Haldol and Ativan in the ER prior to  admission. Patient asleep since admission to the unit. Stressors included drugs and alcohol. Patient is here on an involuntary admission.                Electronically signed by Glenn Dale RN on 12/6/21 at 3:15 PM CST
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
## 2021-12-06 NOTE — PLAN OF CARE
Problem: Coping - Ineffective, Individual:  Goal: Ability to identify and develop effective coping behavior will improve  Description: Ability to identify and develop effective coping behavior will improve  Outcome: Ongoing

## 2021-12-06 NOTE — PROGRESS NOTES
Collateral Information:      Name: Lazara Welch  Relationship: mother  Phone Number: [phone]  Collateral:      Current living arrangement: live with brother and his girlfriend  Current Support System:  none  Employment: unemployed

## 2021-12-06 NOTE — PROGRESS NOTES
Admission Note      Reason for admission/Target Symptom: Patient admitted to [facility] due to Patient making statements that his shoes and clothes are bugged and that he is afraid that someone is going to come after him and his family. Patient states that he \"did a line of ice\" tonight  Diagnoses: Depression NOS  UDS: Amphetamine, Cannabinoid   BAL:  Neg    SW met with treatment team to discuss patient's treatment including care planning, discharge planning, and follow-up needs. Pt has been admitted to [facility]. Treatment team has identified patient's discharge needs as medication management and outpatient therapy/counseling. Pt confirmed  the need for ongoing treatment post inpatient stay. Pt was also provided a handout of contact information for drug and alcohol treatment centers and other community support service such as [facility], AA, and [facility].
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
## 2021-12-06 NOTE — PROGRESS NOTES
Treatment Team Note:    TAIWO met with [address] team to discuss Pts [address] plans. Progress/Behavior/Group Attendance: TBD    Target Symptoms/Reason for admission: Patient admitted to [facility] due to Patient making statements that his shoes and clothes are bugged and that he is afraid that someone is going to come after him and his family. Patient states that he \"did a line of ice\" tonight  Diagnoses: Depression NOS  UDS: Amphetamine, Cannabinoid   BAL:  Neg    AftercarePlan: [address] lives with: SW will meet with pt to gather information. Collateral obtained from: SW will meet with pt to gather release of information.   On:    Family Session: CARON    Misc:

## 2021-12-07 PROBLEM — F17.200 TOBACCO USE DISORDER: Status: ACTIVE | Noted: 2021-12-07

## 2021-12-07 PROBLEM — F19.959 SUBSTANCE-INDUCED PSYCHOTIC DISORDER (HCC): Status: ACTIVE | Noted: 2021-12-07

## 2021-12-07 PROBLEM — F15.20 METHAMPHETAMINE USE DISORDER, MODERATE, DEPENDENCE (HCC): Status: ACTIVE | Noted: 2021-12-07

## 2021-12-07 PROBLEM — F12.20 CANNABIS USE DISORDER, MODERATE, DEPENDENCE (HCC): Status: ACTIVE | Noted: 2021-12-07

## 2021-12-07 PROCEDURE — 90792 PSYCH DIAG EVAL W/MED SRVCS: CPT | Performed by: NURSE PRACTITIONER

## 2021-12-07 PROCEDURE — 1240000000 HC EMOTIONAL WELLNESS R&B

## 2021-12-07 PROCEDURE — 6370000000 HC RX 637 (ALT 250 FOR IP): Performed by: NURSE PRACTITIONER

## 2021-12-07 PROCEDURE — 6370000000 HC RX 637 (ALT 250 FOR IP): Performed by: PSYCHIATRY & NEUROLOGY

## 2021-12-07 RX ORDER — RISPERIDONE 1 MG/1
2 TABLET, FILM COATED ORAL NIGHTLY
Status: DISCONTINUED | OUTPATIENT
Start: 2021-12-07 | End: 2021-12-08

## 2021-12-07 RX ORDER — HYDROXYZINE HYDROCHLORIDE 25 MG/1
25 TABLET, FILM COATED ORAL 3 TIMES DAILY PRN
Status: DISCONTINUED | OUTPATIENT
Start: 2021-12-07 | End: 2021-12-09 | Stop reason: HOSPADM

## 2021-12-07 RX ADMIN — RISPERIDONE 2 MG: 1 TABLET ORAL at 20:35

## 2021-12-07 NOTE — PROGRESS NOTES
Group Therapy Note    Start Time: 800  End Time:  078  Number of Participants: 15    Type of Group: Community Meeting       Patient's Goal:  \"meeting the people that's around me. Being a better person and also work on the goals I have in life\"      Notes:      Participation Level:  Active Listener       Participation Quality: Appropriate      Thought Process/Content: Logical      Affective Functioning: Congruent      Mood: calm      Level of consciousness:  Alert      Modes of Intervention: Support      Discipline Responsible: Behavioral Health Tech II      Signature:  Cole Ceballos

## 2021-12-07 NOTE — PROGRESS NOTES
Sw placed a call to get collateral from patients mom Cydneyrosio Evans and left a message at [phone]

## 2021-12-07 NOTE — PLAN OF CARE
Problem: Coping - Ineffective, Individual:  Goal: Ability to identify and develop effective coping behavior will improve  Outcome: Ongoing     Problem: Discharge Planning:  Goal: Absence of hematoma at arterial access site  Outcome: Met This Shift     Problem: Health Maintenance - Impaired:  Description: Provide access to substance abuse treatment.   Goal: Ability to manage health-related needs will improve  Outcome: Ongoing     Problem: Mood - Altered:  Goal: Mood stable  Outcome: Ongoing

## 2021-12-07 NOTE — PLAN OF CARE
Problem: Coping - Ineffective, Individual:  Goal: Ability to identify and develop effective coping behavior will improve  Description: Ability to identify and develop effective coping behavior will improve  12/7/2021 1141 by Ada Smith LPC  Outcome: Ongoing     Group Therapy Note     Date: 12/7/2021  Start Time: 1000  End Time:  7254  Number of Participants: 8     Type of Group: Psychotherapy     Patient's Goal: Patient will process emotions and actions and how to relate to other or their with others. Patient discussed open communication and feelings and emotions. Notes:  Patient attended process group as scheduled, patient identified a issue to work on today regarding how they will interact and relate to others. Status After Intervention:  Improved     Participation Level:  Active Listener     Participation Quality: Appropriate, Attentive, and Sharing        Speech:  normal        Thought Process/Content: Logical        Affective Functioning: Congruent        Mood: euthymic        Level of consciousness:  Alert        Response to Learning: Able to verbalize current knowledge/experience        Endings: None Reported     Modes of Intervention: Education, Support, and Socialization        Discipline Responsible: /Counselor        Signature:  Basilio Lorenz

## 2021-12-07 NOTE — H&P
Behavioral Services  Medicare Certification Upon Admission    I certify that this patient's inpatient psychiatric hospital admission is medically necessary for:    [x] (1) Treatment which could reasonably be expected to improve this patient's condition,       [] (2) Or for diagnostic study;     AND     [x](2) The inpatient psychiatric services are provided while the individual is under the care of a physician and are included in the individualized plan of care.     Estimated length of stay/service 3 days-5 weeks    Plan for post-hospital care: TBA    Electronically signed by ABDIRASHID Park on 12/7/2021 at 10:28 AM

## 2021-12-07 NOTE — PROGRESS NOTES
I Daily Shift Assessment-Adult Unit  Nursing Progress Note          Room: [facility]-   Name: Zurdo Hou   Age: 32 y.o. Gender: male   Dx: substance abuse, paranoia  Precautions: suicide risk and fall risk  Inpatient Status: voluntary     SLEEP:    Sleep: Yes,   Sleep Quality Good   Hours Slept: 7   Sleep Medications: No  PRN Sleep Meds: No       MEDICAL:      Other PRN Meds: No   Med Compliant: Yes   Accu-Chek: No   Oxygen/CPAP/BiPAP: No  CIWA/CINA: No   PAIN Assessment: none  Side Effects from medication: No    Is Patient experiencing any respiratory symptoms (headache, fever, body aches, cough. Moselle Crape ): no  Patient educated by nursing to practice social distancing, wear masks, wash hands frequently: yes      Metabolic Screening:    No results found for: LABA1C    No results found for: CHOL  No results found for: TRIG  No results found for: HDL  No components found for: LDLCAL  No results found for: LABVLDL      Body mass index is 21.31 kg/m².     BP Readings from Last 2 Encounters:   12/07/21 136/88   11/24/21 128/76         PSYCH:     SI denies suicidal ideation    HI Negative for homicidal ideation        AVH:Absent      Depression: 1 Anxiety: 0       GENERAL:      Appetite: no change from normal  Social: Yes Speech: normal   Appearance:appropriately dressed, appropriately groomed, good hygiene and healthy looking  Assistive Devices: noneLevel of Assist: Independent      GROUP:    Group Participation: Yes  Participation LevelInteractive    Participation QualityAppropriate    Notes:

## 2021-12-07 NOTE — PLAN OF CARE
Group Therapy Note     Date: 12/7/2021  Start Time: 8845  End Time:  1600  Number of Participants: 12     Type of Group: Recovery     Wellness Binder Information  Module Name:  emotional wellness  Session Number:  5     Patient's Goal:  obstacles to emotional wellness     Notes:  pt acknowledged negative thinking as an obstacle to emotional wellness.      Status After Intervention:  Improved     Participation Level: Interactive     Participation Quality: Appropriate, Attentive, and Sharing        Speech:  normal        Thought Process/Content: Logical        Affective Functioning: Congruent        Mood: congruent        Level of consciousness:  Alert, Oriented x4, and Attentive        Response to Learning: Able to verbalize current knowledge/experience        Endings: None Reported     Modes of Intervention: Education        Discipline Responsible: Psychoeducational Specialist        Signature:  Gus Luis
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
## 2021-12-07 NOTE — PROGRESS NOTES
Pt has been sleeping tonight without c/o's. Will continue to monitor via 15 minute rounds.     Electronically signed by Dilshad Mckeon RN on 12/7/2021 at 4:37 AM

## 2021-12-07 NOTE — PLAN OF CARE
Problem: Coping - Ineffective, Individual:  Goal: Ability to identify and develop effective coping behavior will improve  12/7/2021 1143 by Akbar Watkins  Outcome: Ongoing       Group Therapy Note     Date: 12/7/2021  Start Time: 1100  End Time:  1130  Number of Participants: 8     Type of Group: Psychoeducation     Wellness Binder Information  Module Name:  staying well  Session Number:  1     Patient's Goal:  daily maintenance coping skills     Notes:  pt acknowledged use of positive coping skills daily to help stay well.      Status After Intervention:  Improved     Participation Level: Interactive     Participation Quality: Appropriate, Attentive, and Sharing        Speech:  normal        Thought Process/Content: Logical        Affective Functioning: Congruent        Mood: congruent        Level of consciousness:  Alert, Oriented x4, and Attentive        Response to Learning: Able to verbalize current knowledge/experience        Endings: None Reported     Modes of Intervention: Education        Discipline Responsible: Psychoeducational Specialist        Signature:  Akbar Watkins

## 2021-12-07 NOTE — PROGRESS NOTES
[facility] Adult Unit Daily Assessment  Nursing Progress Note    Room: [facility]/607-01   Name: Fany Belcher   Age: 32 y.o. Gender: male   Dx: <principal problem not specified>  Precautions: suicide risk and EP  Inpatient Status: involuntary, ends 12/9/2021       SLEEP:    Sleep Quality Good  Sleep Medications: No   PRN Sleep Meds: No       MEDICAL:    Other PRN Meds: No   Med Compliant: No  Accu-Chek: No  Oxygen/CPAP/BiPAP: No  CIWA/CINA: No   PAIN Assessment: none  Side Effects from medication: No    Is Patient experiencing any respiratory symptoms (headache, fever, body aches, cough. Mathieu Melvin ): no  Patient educated by nursing to practice social distancing, wear masks, wash hands frequently: yes      PSYCH:    Depression: 0   Anxiety: 0   SI denies suicidal ideation   HI Negative for homicidal ideation      AVH:Absent      GENERAL:    Appetite: good    Social: No   Speech: normal   Appearance: appropriately dressed and appropriately groomed      Notes: Pt awakened easily for brief conversation. Pt denies SI/HI. No s/s of acute distress. Pt pleasant, calm & cooperative at this time. Pt isolative to his room this evening, resting in bed. No s/s of acute distress noted. Will continue to monitor via 15 minute rounds.     Electronically signed by Ade Cast RN on 12/7/2021 at 4:27 AM

## 2021-12-07 NOTE — PROGRESS NOTES
Treatment Team Note:     TAIWO met with [address] [address] team to discuss Pts TX and DC plans.      Progress/Behavior/Group Attendance: TBD     Target Symptoms/Reason for admission: Patient admitted to [facility] due to Patient making statements that his shoes and clothes are bugged and that he is afraid that someone is going to come after him and his family. Patient states that he \"did a line of ice\" tonight  Diagnoses: Substance-induced psychotic disorder , Methamphetamine use  Cannabis use disorder, Tobacco use disorder   UDS: Amphetamine, Cannabinoid   BAL:  Neg     AftercarePlan: [address]     Pt lives with: SW will meet with pt to gather information.     Collateral obtained from: SW will meet with pt to gather release of information.   On:     Family Session: CARON     Misc:

## 2021-12-08 LAB
ANION GAP SERPL CALCULATED.3IONS-SCNC: 10 MMOL/L (ref 7–19)
BUN BLDV-MCNC: 12 MG/DL (ref 6–20)
CALCIUM SERPL-MCNC: 9.5 MG/DL (ref 8.6–10)
CHLORIDE BLD-SCNC: 102 MMOL/L (ref 98–111)
CO2: 28 MMOL/L (ref 22–29)
CREAT SERPL-MCNC: 1 MG/DL (ref 0.5–1.2)
GFR AFRICAN AMERICAN: >59
GFR NON-AFRICAN AMERICAN: >60
GLUCOSE BLD-MCNC: 55 MG/DL (ref 74–109)
POTASSIUM SERPL-SCNC: 4 MMOL/L (ref 3.5–5)
SODIUM BLD-SCNC: 140 MMOL/L (ref 136–145)
TSH REFLEX FT4: 1.39 UIU/ML (ref 0.35–5.5)
VITAMIN B-12: 418 PG/ML (ref 211–946)
VITAMIN D 25-HYDROXY: 19.8 NG/ML

## 2021-12-08 PROCEDURE — 1240000000 HC EMOTIONAL WELLNESS R&B

## 2021-12-08 PROCEDURE — 6370000000 HC RX 637 (ALT 250 FOR IP): Performed by: NURSE PRACTITIONER

## 2021-12-08 PROCEDURE — 6370000000 HC RX 637 (ALT 250 FOR IP): Performed by: PSYCHIATRY & NEUROLOGY

## 2021-12-08 PROCEDURE — 82306 VITAMIN D 25 HYDROXY: CPT

## 2021-12-08 PROCEDURE — 80048 BASIC METABOLIC PNL TOTAL CA: CPT

## 2021-12-08 PROCEDURE — 82607 VITAMIN B-12: CPT

## 2021-12-08 PROCEDURE — 84443 ASSAY THYROID STIM HORMONE: CPT

## 2021-12-08 PROCEDURE — 36415 COLL VENOUS BLD VENIPUNCTURE: CPT

## 2021-12-08 PROCEDURE — 99231 SBSQ HOSP IP/OBS SF/LOW 25: CPT | Performed by: NURSE PRACTITIONER

## 2021-12-08 RX ADMIN — HYDROXYZINE HYDROCHLORIDE 25 MG: 25 TABLET, FILM COATED ORAL at 15:10

## 2021-12-08 ASSESSMENT — SLEEP AND FATIGUE QUESTIONNAIRES
DO YOU HAVE DIFFICULTY SLEEPING: NO
DO YOU USE A SLEEP AID: NO
AVERAGE NUMBER OF SLEEP HOURS: 3

## 2021-12-08 NOTE — PROGRESS NOTES
Group Note    Number of Participants in Group: 14  Number of Patients on Unit:14      Patient attended group:Yes  Reason for Absence:  Intervention for patient absence:        Type of Group:   Wrap-Up/Relaxation    Patient's Goal: See wrap up group sheet    Participation Level:     Active Participent           Patient Response to Learning: Yes    Patient's Behavior: Cooperative    Is Patient Social/Interacting: Yes    Relaxation:   Television:No   Reading:No   Game/Puzzle:No   Phone: No       Notes/Comments:      Please see patient's wrap up group sheet for patient's comments       Electronically signed by Julio Stone on 12/7/21 at 11:50 PM CST

## 2021-12-08 NOTE — PROGRESS NOTES
SW placed a call to get collateral from patients brother and brother jasont have voice mail Hawa Garcia [phone]

## 2021-12-08 NOTE — H&P
HISTORY and PHYSICAL      CHIEF COMPLAINT:  Psychosis    Reason for Admission:  Psychosis    History Obtained From:  Patient, chart    HISTORY OF PRESENT ILLNESS:      The patient is a 32 y.o. male who is admitted to the [place] [address] unit with worsening mood issues. He has no physical complaints. No CP or SOA. No abdominal pain or N/V. No dysuria. No new pain complaints. No fevers. Past Medical History:    No past medical history on file. Past Surgical History:        Procedure Laterality Date    HAND SURGERY  2013/2014         Medications Prior to Admission:    No medications prior to admission. Allergies:  Lactose intolerance (gi)    Social History:   TOBACCO:   reports that he has been smoking cigarettes. He has never used smokeless tobacco.  ETOH:   reports current alcohol use. DRUGS:   reports current drug use. Drugs: Marijuana (Weed) and Methamphetamines (Crystal Meth). MARITAL STATUS:  single  OCCUPATION:  He does work  Patient currently lives with family       Family History:   No family history on file. REVIEW OF SYSTEMS:  Constitutional: neg  CV: neg  Pulmonary: neg  GI: neg  : neg  Psych: psychosis  Neuro: neg  Skin: neg  MusculoSkeletal: neg  HEENT: neg  Joints: neg    Vitals:  BP (!) 124/92   Pulse 72   Temp 97.8 °F (36.6 °C) (Temporal)   Resp 16   Ht 5' 6\" (1.676 m)   Wt 132 lb (59.9 kg)   SpO2 98%   BMI 21.31 kg/m²     PHYSICAL EXAM:  Gen: NAD, alert  HEENT: WNL  Lymph: no LAD  Neck: no JVD or masses  Chest: CTA bilat  CV: RRR  Abdomen: NT/ND  Extrem: no C/C/E  Neuro: non focal  Skin: no rashes  Joints: no redness    DATA:  I have reviewed the admission labs and imaging tests.     ASSESSMENT AND PLAN:      Principal Problem:    Substance-induced psychotic disorder---follow with Psych    Polysubstance   Hypokalemia---recheck labs        Marietta Butt MD  8:31 AM 12/8/2021

## 2021-12-08 NOTE — PROGRESS NOTES
Sw placed a call to get collateral from patients mom Sabula Zoraida and left a message at [phone]

## 2021-12-08 NOTE — PROGRESS NOTES
[address] [place] [address]      Psychiatric Progress Note    Name:  Maru Padron  Date:  12/8/2021  Age:  32 y.o. Sex:  male  Ethnicity:   Primary Care Physician:  No primary care provider on file. Patient Care Team:  No care team member to display  Chief Complaint: \" I am doing a lot better today. \"        Historian:patient  Complaint Type: anxiety, decreased appetite, depression, illegal drug usage, loss of interest in favorite activities, sleep disturbance and tobacco use  Course of Symptoms: improved  Precipitating Factors: methamphetamine use disorder          Subjective  Nursing notes were reviewed and patient had no behavioral issues during the night. No as needed medications were administered during the night. Today he denies suicidal ideation, homicidal ideation and psychosis. Reports that he slept poorly last night. He is future oriented and is and ready to be discharged tomorrow. Reports he will be going to live with his brother. Social workers tried to obtain collateral however cannot get any family members to answer the phone. He continues to deny that he purposely used methamphetamine. He continues to decline inpatient or outpatient chemical dependency treatment. Patient has been calm and cooperative with staff and peers. Patient has been compliant with medications. Patient has been attending groups. Patient reports appetite as \"my appetite is always good. \" Patient reports no side effects from medications. Objective  Vitals:    12/08/21 0807   BP: (!) 124/92   Pulse: 72   Resp: 16   Temp: 97.8 °F (36.6 °C)   SpO2: 98%       Previous Psychiatric/Substance Use History      Medical History:  No past medical history on file.      HEATON History:   Social History     Substance and Sexual Activity   Alcohol Use Yes    Comment: Occ         Social History     Substance and Sexual Activity   Drug Use Yes    Types: Marijuana (Alex Seller), Methamphetamines (Crystal Meth) Social History     Tobacco Use   Smoking Status Current Some Day Smoker    Types: Cigarettes   Smokeless Tobacco Never Used        Family History:     No family history on file. Mental Status:  Level of consciousness:  within normal limits and awake  Appearance:  well-appearing, street clothes, in chair, good grooming and good hygiene  Behavior/Motor:  no abnormalities noted  Attitude toward examiner:  cooperative, attentive and good eye contact  Speech:  normal rate and normal volume  Mood:  \" I am feeling really good today. \"  Affect:  mood congruent  Thought processes:  linear and goal directed  Thought content:  Homocidal ideation :denies  Suicidal Ideation:  denies suicidal ideation  Delusions:  no evidence of delusions  Perceptual Disturbance:  denies any perceptual disturbance  Cognition:  oriented to person, place, and time  Concentration : fair  Memory intact for recent and remote  Fund of knowledge:  average  Abstract thinking:  adequate  Insight: improved  Judgment:  good     nicotine  1 patch TransDERmal Daily       Current Medications:  Current Facility-Administered Medications   Medication Dose Route Frequency Provider Last Rate Last Admin    hydrOXYzine (ATARAX) tablet 25 mg  25 mg Oral TID PRN Bobbi Fan, APRN   25 mg at 12/08/21 1510    acetaminophen (TYLENOL) tablet 650 mg  650 mg Oral Q4H PRN Jhon Simons MD        polyethylene glycol (GLYCOLAX) packet 17 g  17 g Oral Daily PRN Jhon Simons MD        nicotine (NICODERM CQ) 21 MG/24HR 1 patch  1 patch TransDERmal Daily Jhon Simons MD   1 patch at 12/08/21 1359    traZODone (DESYREL) tablet 50 mg  50 mg Oral Nightly PRN Jhon Simons MD           Psychotherapy:   SUPPORTIVE    DSM V Diagnoses:      Principal Problem:    Substance-induced psychotic disorder ([facility]  Active Problems:    Methamphetamine use disorder, moderate, dependence ([facility]    Tobacco use disorder    Cannabis use disorder, moderate, dependence [facility])  Resolved Problems:    * No resolved hospital problems. *            Plan:    Encourage group therapy  15 minute safety checks  Medical monitoring by Dr. Cristiane Cross and associates  Continue current therapy and medications  Discharge tomorrow if stable    Amount of time spent with patient:  15 minutes with greater than 50% of the time spent in counseling and collaboration of care.     ABDIRASHID Turner  Clinician Signature: signed electronically

## 2021-12-08 NOTE — BH NOTE
Group Therapy Note    Date: 12/8/2021  Start Time: 1330  End Time:  1400  Number of Participants: 11    Type of Group: Spirituality     Wellness Binder Information  Module Name:  Mindfulness  Session Number:      Patient's Goal:  To rest the mind by focusing on the present through the use of the human senses. Notes:      Status After Intervention:  Improved    Participation Level:  Active Listener and Interactive    Participation Quality: Appropriate, Attentive and Sharing      Speech:  normal      Thought Process/Content:       Affective Functioning: Congruent      Mood: euthymic      Level of consciousness:  Oriented x4 and Attentive      Response to Learning: Able to verbalize current knowledge/experience and Capable of insight      Endings:     Modes of Intervention: Education, Support and Activity      Discipline Responsible:       Signature:  Dionisio Wallis MA [facility]

## 2021-12-08 NOTE — PLAN OF CARE
Problem: Coping - Ineffective, Individual:  Goal: Ability to identify and develop effective coping behavior will improve  12/8/2021 1147 by Antolin Negrete  Outcome: Ongoing     Group Therapy Note     Date: 12/8/2021  Start Time: 1100  End Time:  1130  Number of Participants: 13     Type of Group: Psychoeducation     Wellness Binder Information  Module Name:  Emotional wellness  Session Number:  1     Patient's Goal:  validation of feelings     Notes:  pt acknowledged to have feelings validated it may be necessary to share feeling with others.      Status After Intervention:  Improved     Participation Level: Interactive     Participation Quality: Appropriate, Attentive, and Sharing        Speech:  normal        Thought Process/Content: Logical        Affective Functioning: Congruent        Mood: congruent        Level of consciousness:  Alert, Oriented x4, and Attentive        Response to Learning: Able to verbalize current knowledge/experience        Endings: None Reported     Modes of Intervention: Education        Discipline Responsible: Psychoeducational Specialist        Signature:  Antolin Negrete

## 2021-12-08 NOTE — PROGRESS NOTES
Treatment Team Note:     TAIWO met with [address] [address] team to discuss Pts TX and DC plans.      Progress/Behavior/Group Attendance: TBD     Target Symptoms/Reason for admission: Patient admitted to [facility] due to Patient making statements that his shoes and clothes are bugged and that he is afraid that someone is going to come after him and his family. Patient states that he \"did a line of ice\" tonight  Diagnoses: Substance-induced psychotic disorder , Methamphetamine use  Cannabis use disorder, Tobacco use disorder   UDS: Amphetamine, Cannabinoid   BAL:  Neg     AftercarePlan: [facility] [address] [place] [address]     Pt lives with: SW will meet with pt to gather information.     Collateral obtained from: SW will meet with pt to gather release of information.   On:     Family Session: CARON     Misc:

## 2021-12-08 NOTE — PLAN OF CARE
Problem: Coping - Ineffective, Individual:  Goal: Ability to identify and develop effective coping behavior will improve  Description: Ability to identify and develop effective coping behavior will improve  12/8/2021 1126 by Rosie Chapa LPC  Outcome: Ongoing     Group Therapy Note     Date: 12/8/2021  Start Time: 1000  End Time:  4673  Number of Participants: 11     Type of Group: Psychotherapy     Patient's Goal: Patient will process emotions and actions and how to relate to other or their with others. Patient discussed open communication and feelings and emotions. Notes:  Patient attended process group as scheduled, patient identified a issue to work on today regarding how they will interact and relate to others. Status After Intervention:  Improved     Participation Level:  Active Listener     Participation Quality: Appropriate, Attentive, and Sharing        Speech:  normal        Thought Process/Content: Logical        Affective Functioning: Congruent        Mood: euthymic        Level of consciousness:  Alert        Response to Learning: Able to verbalize current knowledge/experience        Endings: None Reported     Modes of Intervention: Education, Support, and Socialization        Discipline Responsible: /Counselor        Signature:  Rosie Chapa [facility]

## 2021-12-08 NOTE — PROGRESS NOTES
[facility] Adult Unit Daily Assessment  Nursing Progress Note    Room: [facility]/607-01   Name: Maru Padron   Age: 32 y.o. Gender: male   Dx: Substance-induced psychotic disorder ([address]  Precautions: suicide risk  Inpatient Status: involuntary       SLEEP:    Sleep Quality Good  Sleep Medications: No, received Risperdal 2mg at HS   PRN Sleep Meds: No       MEDICAL:    Other PRN Meds: No   Med Compliant: Yes  Accu-Chek: No  Oxygen/CPAP/BiPAP: No  CIWA/CINA: No   PAIN Assessment: none  Side Effects from medication: No    Is Patient experiencing any respiratory symptoms (headache, fever, body aches, cough. Pinky Angles ): no  Patient educated by nursing to practice social distancing, wear masks, wash hands frequently: yes      PSYCH:    Depression: 0   Anxiety: 0   SI denies suicidal ideation   HI Negative for homicidal ideation      AVH:Absent      GENERAL:    Appetite: good    Social: Yes, with staff   Speech: normal   Appearance: appropriately dressed, appropriately groomed and healthy looking    GROUP:    Group Participation: Yes  Participation Quality: Active Listener and Interactive    Notes: Pt A&O x 4 with good eye contact, good concentration & brightened facial expression. Pt pleasant, calm, cooperative & med compliant. Pt was initially hesitant to take scheduled HS Risperdal, stating 'I don't like taking meds.' Pt complied & took scheduled Risperdal. Pt denies SI/HI. No s/s of acute distress noted. Pt asleep by 2200. Will continue to monitor via 15 minute rounds.         Electronically signed by Kerri Guaman RN on 12/7/2021 at 11:14 PM

## 2021-12-08 NOTE — PROGRESS NOTES
Group Therapy Note    Start Time: 800  End Time:  229  Number of Participants: 15    Type of Group: Community Meeting       Patient's Goal:  \"I'm going to work on doing a better job of understanding what I need to do positive things, my goals\"      Notes:        Participation Level:  Active Listener       Participation Quality: Appropriate      Thought Process/Content: Logical      Affective Functioning: Congruent      Mood: Calm      Level of consciousness:  Alert      Modes of Intervention: Support      Discipline Responsible: Behavioral Health Tech II      Signature:  Tres Hernandez

## 2021-12-08 NOTE — PROGRESS NOTES
[facility] Daily Shift Assessment  Nursing Progress Note    Room: 06/607-01 Name: Veronica Choudhury Age: 32 y.o. Gender: male   Dx: Substance-induced psychotic disorder ([address]  Precautions: close watch and suicide risk  Target Symptoms:   Accu-Chek: NoSleep: Yes,Sleep Quality Good SI No AVH denies [facility]  ADLs: Yes Speech: normal Depression: 0 Anxiety: 0   Participation LevelActive Listener and Interactive  Appetite: Good  Respiratory symptoms: No Headache: No Body aches: no Fever: No Cough: No  Patients encouraged to wear masks, wash hands frequently and practice social distancing while on the unit: Yes  Visitation: No   Participation QualityAppropriate, Attentive, Sharing and Supportive    Complaints: none    Notes: Patient is observed attending group, being social, and having a good appetite. Patient is mildly anxious, but much improved from time of admission.      Signature: Tatum Sparrow RN

## 2021-12-08 NOTE — PROGRESS NOTES
Requirement Note     SW met with pt to complete Psychosocial and CSSR-S on this date. Patients long and short term goals discussed. Patient voiced understanding. Treatment plan sheet signed. Patient verbalized understanding of the treatment plan. Patient participated in goals and objectives of the treatment plan. Patient completed safety plan with , patient received copy of plan, and original was placed into patient's chart. SW explained treatment goals with pt. Decreasing depression and anxiety by improvement of positive coping patterns was discussed. Pt acknowledged understanding of treatment goals and signed treatment plan signature sheet. In the last 6 months has the pt been a danger to self: YES  In the last 6 months has the pt been a danger to others: NO  Legal Guardian/POA: NO     Provided patient with [employer] handout entitled \"Quitting Smoking. \"  Reviewed handout with patient: addressing dangers of smoking, developing coping skills, and providing basic information about quitting. Patient received all components practical counseling of tobacco practical counseling during the hospital stay.

## 2021-12-08 NOTE — PLAN OF CARE
Problem: Mood - Altered:  Goal: Mood stable  Description: Mood stable  Outcome: Ongoing     Problem: Injury - Risk of, Substance Overdose:  Goal: No signs of physiological stress  Description: Absence of drug withdrawal signs and symptoms  Outcome: Ongoing

## 2021-12-09 VITALS
RESPIRATION RATE: 20 BRPM | BODY MASS INDEX: 21.21 KG/M2 | SYSTOLIC BLOOD PRESSURE: 137 MMHG | TEMPERATURE: 97.8 F | OXYGEN SATURATION: 99 % | DIASTOLIC BLOOD PRESSURE: 91 MMHG | HEART RATE: 86 BPM | HEIGHT: 66 IN | WEIGHT: 132 LBS

## 2021-12-09 PROCEDURE — 99238 HOSP IP/OBS DSCHRG MGMT 30/<: CPT | Performed by: NURSE PRACTITIONER

## 2021-12-09 PROCEDURE — 5130000000 HC BRIDGE APPOINTMENT

## 2021-12-09 RX ORDER — TRAZODONE HYDROCHLORIDE 50 MG/1
50 TABLET ORAL NIGHTLY PRN
Qty: 14 TABLET | Refills: 0 | Status: SHIPPED | OUTPATIENT
Start: 2021-12-09

## 2021-12-09 RX ORDER — HYDROXYZINE HYDROCHLORIDE 25 MG/1
25 TABLET, FILM COATED ORAL 3 TIMES DAILY PRN
Qty: 42 TABLET | Refills: 0 | Status: SHIPPED | OUTPATIENT
Start: 2021-12-09 | End: 2021-12-23

## 2021-12-09 NOTE — PROGRESS NOTES
Discharge Note     Pt discharging on this date. Pt denies SI, HI, and AVH at this time. Pt reports improvement in behavior and is leaving unit in overall good condition. SW and pt discussed pt's follow up appointments and importance of attending appointments as scheduled, pt voiced understanding and agreement. Pt and SW also discussed pt safety plan and pt able to verbally identify: warning signs, coping strategies, places and people that help make the pt feel better/distract negative thoughts, friends/family/agencies/professionals the pt can reach out to in a crisis, and something that is important to the pt/worth living for. Pt provided the national suicide prevention hotline number (1-019-586-514-770-5488) as well as local community behavioral health [facility]) crisis number for emergencies (7-372-380-478-521-9651). Pt to follow up with:  [address] [place] [address]) on _12_/_10_/21 @ 12:00pm. Patient will follow up with [facility] for medication management, patient will be seen on 12__/20__/21 @ 8:30am for the med management appt. Referral to out patient tobacco cessation counseling treatment:    Patient refused referral to outpatient tobacco cessation counseling    SW offered to assist pt with transportation, pt reports that he has transportation.

## 2021-12-09 NOTE — PROGRESS NOTES
Group Note    Number of Participants in Group: 12  Number of Patients on Unit:15      Patient attended group:No  Reason for Absence:  Intervention for patient absence:        Type of Group:   Wrap-Up/Relaxation    Patient's Goal: See wrap up group sheet    Participation Level:    None           Patient Response to Learning: No    Patient's Behavior: sleeping    Is Patient Social/Interacting: No    Relaxation:   Television:No   Reading:No   Game/Puzzle:No   Phone: No       Notes/Comments:      Please see patient's wrap up group sheet for patient's comments       Electronically signed by Micah Tang RN on 12/9/21 at 1:26 AM CST

## 2021-12-09 NOTE — PROGRESS NOTES
Progress Note  Joseluis Night  12/8/2021 10:19 PM  Subjective:   Admit Date:   12/6/2021      CC/ADMIT DX:       Interval History:   Reviewed overnight events and nursing notes. He has no new medical issues. I have reviewed all labs/diagnostics from the last 24hrs. ROS:   I have done a 10 point ROS and all are negative, except what is mentioned in the HPI. ADULT DIET; Regular    Medications:      nicotine  1 patch TransDERmal Daily           Objective:   Vitals: /87   Pulse 88   Temp 98.2 °F (36.8 °C) (Temporal)   Resp 18   Ht 5' 6\" (1.676 m)   Wt 132 lb (59.9 kg)   SpO2 100%   BMI 21.31 kg/m²  No intake or output data in the 24 hours ending 12/08/21 2219  General appearance: alert and cooperative with exam  Extremities: extremities normal, atraumatic, no cyanosis or edema  Neurologic:  No obvious focal neurologic deficits  Skin: no rashes    Assessment and Plan:   Principal Problem:    Substance-induced psychotic disorder ([address]  Active Problems:    Methamphetamine use disorder, moderate, dependence ([facility])    Tobacco use disorder    Cannabis use disorder, moderate, dependence ([address]  Resolved Problems:    * No resolved hospital problems. *    Vit D Def    Plan:  1. Continue present medication(s)   2. Follow with Psych  3. Replace Vit D      Discharge planning:    home     Reviewed treatment plans with the patient and/or family.              Electronically signed by Marietta Butt MD on 12/8/2021 at 10:19 PM

## 2021-12-09 NOTE — PROGRESS NOTES
CLINICAL PHARMACY NOTE: MEDS TO BEDS    Total # of Prescriptions Filled: 2   The following medications were delivered to the patient:  · Trazodone 50 mg  · Hydroxyzine 25 mg    Additional Documentation:    Handed scripts to [employer]. At pharmacy window.

## 2021-12-09 NOTE — PROGRESS NOTES
[facility] Adult Unit Daily Assessment  Nursing Progress Note    Room: [facility]/607-01   Name: Suzy Ray   Age: 32 y.o. Gender: male   Dx: Substance-induced psychotic disorder ([address]  Precautions: suicide risk  Inpatient Status: involuntary       SLEEP:    Sleep Quality Poor  Sleep Medications: No   PRN Sleep Meds: No       MEDICAL:    Other PRN Meds: Yes and No   Med Compliant: n/a  Accu-Chek: No  Oxygen/CPAP/BiPAP: No  CIWA/CINA: No   PAIN Assessment: none  Side Effects from medication: No    Is Patient experiencing any respiratory symptoms (headache, fever, body aches, cough. Meghana Mattson ): no  Patient educated by nursing to practice social distancing, wear masks, wash hands frequently: yes      PSYCH:    Depression: 0   Anxiety: 0   SI denies suicidal ideation   HI Negative for homicidal ideation      AVH:Absent      GENERAL:    Appetite: good    Social: Yes   Speech: normal   Appearance: appropriately dressed and healthy looking    GROUP:    Group Participation: No  Participation Quality: None    Notes:     Patient is cooperative, Alert and Oriented x4, appears Pleasant. Patient Rates Depression a 0 and Anxiety a 0  on a 0-10 scale, with 10 being the worst. Patient affect is Congruent. Exhibited a flat facial expression; maintained fair  eye contact throughout interview. Patient denies [address]. Patient is compliant with medications. No signs of distress noted; Patient observed resting in bed with eyes closed, respirations equal and unlabored after interview. Will continue to monitor patient status via 15 minute safety checks.        Electronically signed by Luiz Pedersen RN on 12/9/21 at 1:13 AM CST
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
## 2022-01-08 ASSESSMENT — ENCOUNTER SYMPTOMS
BACK PAIN: 0
VOMITING: 0
SHORTNESS OF BREATH: 0
RHINORRHEA: 0
COUGH: 0
NAUSEA: 0
COLOR CHANGE: 0
EYE DISCHARGE: 0
ABDOMINAL PAIN: 0

## 2022-01-08 NOTE — ED PROVIDER NOTES
[facility]  eMERGENCY dEPARTMENT eNCOUnter      Pt Name: Lloyd Rossi  MRN: [id number]  [place] 1990  Date of evaluation: 11/24/2021  Provider: Vikram Araiza MD    CHIEF COMPLAINT       Chief Complaint   Patient presents with    Paranoid         HISTORY OF PRESENT ILLNESS   (Location/Symptom, Timing/Onset,Context/Setting, Quality, Duration, Modifying Factors, Severity)  Note limiting factors. Lloyd Rossi is a 32 y.o. male who presents to the emergency department with paranoia. Patient states that he believes someone laced his cigarette with meth. Patient believes that people are watching him and following him. Patient denies suicidal ideation, homicidal ideation or hallucinations. Patient denies recent depression or attempts to harm himself in the past.  Patient is currently on probation. During interview, patient believes that the nurses are talking about him. HPI    NursingNotes were reviewed. REVIEW OF SYSTEMS    (2-9 systems for level 4, 10 or more for level 5)     Review of Systems   Constitutional: Negative for chills and fever. HENT: Negative for congestion and rhinorrhea. Eyes: Negative for discharge. Respiratory: Negative for cough and shortness of breath. Cardiovascular: Negative for chest pain and palpitations. Gastrointestinal: Negative for abdominal pain, nausea and vomiting. Genitourinary: Negative for difficulty urinating and dysuria. Musculoskeletal: Negative for back pain and neck pain. Skin: Negative for color change and pallor. Neurological: Negative for syncope and light-headedness. Psychiatric/Behavioral: Positive for agitation. Negative for suicidal ideas. The patient is nervous/anxious. All other systems reviewed and are negative. PAST MEDICALHISTORY   History reviewed. No pertinent past medical history.       SURGICAL HISTORY       Past Surgical History:   Procedure Laterality Date    HAND SURGERY  2013/2014 CURRENT MEDICATIONS   There are no discharge medications for this patient. ALLERGIES     Lactose intolerance (gi)    FAMILY HISTORY     History reviewed. No pertinent family history. SOCIAL HISTORY       Social History     Socioeconomic History    Marital status: Single     Spouse name: None    Number of children: None    Years of education: None    Highest education level: None   Occupational History    None   Tobacco Use    Smoking status: Current Some Day Smoker     Types: Cigarettes    Smokeless tobacco: Never Used   Vaping Use    Vaping Use: Never used   Substance and Sexual Activity    Alcohol use: Yes     Comment: Occ    Drug use: Yes     Types: Marijuana (Weed), Methamphetamines (Crystal Meth)    Sexual activity: None   Other Topics Concern    None   Social History Narrative    None     Social Determinants of Health     Financial Resource Strain:     Difficulty of Paying Living Expenses: Not on file   Food Insecurity:     Worried About Running Out of Food in the Last Year: Not on file    Griffin of Food in the Last Year: Not on file   Transportation Needs:     Lack of Transportation (Medical): Not on file    Lack of Transportation (Non-Medical):  Not on file   Physical Activity:     Days of Exercise per Week: Not on file    Minutes of Exercise per Session: Not on file   Stress:     Feeling of Stress : Not on file   Social Connections:     Frequency of Communication with Friends and Family: Not on file    Frequency of Social Gatherings with Friends and Family: Not on file    Attends Orthodox Services: Not on file    Active Member of Clubs or Organizations: Not on file    Attends Club or Organization Meetings: Not on file    Marital Status: Not on file   Intimate Partner Violence:     Fear of Current or Ex-Partner: Not on file    Emotionally Abused: Not on file    Physically Abused: Not on file    Sexually Abused: Not on file   Housing Stability:     Unable to Pay for Housing in the Last Year: Not on file    Number of Places Lived in the Last Year: Not on file    Unstable Housing in the Last Year: Not on file       SCREENINGS             PHYSICAL EXAM    (up to 7 for level 4, 8 or more for level 5)     ED Triage Vitals [11/24/21 0016]   BP Temp Temp Source Pulse Resp SpO2 Height Weight   (!) 139/96 97.8 °F (36.6 °C) Oral 121 18 97 % 5' 6\" (1.676 m) 132 lb (59.9 kg)       Physical Exam  Vitals and nursing note reviewed. Constitutional:       General: He is not in acute distress. Appearance: Normal appearance. HENT:      Head: Normocephalic and atraumatic. Right Ear: External ear normal.      Left Ear: External ear normal.      Nose: Nose normal.      Mouth/Throat:      Mouth: Mucous membranes are moist.      Pharynx: Oropharynx is clear. No oropharyngeal exudate. Eyes:      General: No scleral icterus. Conjunctiva/sclera: Conjunctivae normal.      Pupils: Pupils are equal, round, and reactive to light. Cardiovascular:      Rate and Rhythm: Regular rhythm. Tachycardia present. Pulses: Normal pulses. Heart sounds: Normal heart sounds. Pulmonary:      Effort: Pulmonary effort is normal.      Breath sounds: Normal breath sounds. Abdominal:      General: Bowel sounds are normal.      Palpations: Abdomen is soft. Tenderness: There is no abdominal tenderness. There is no guarding. Musculoskeletal:         General: No tenderness or deformity. Cervical back: Neck supple. No rigidity. Skin:     General: Skin is warm and dry. Capillary Refill: Capillary refill takes less than 2 seconds. Coloration: Skin is not jaundiced. Neurological:      General: No focal deficit present. Mental Status: He is alert and oriented to person, place, and time. Mental status is at baseline. Cranial Nerves: No cranial nerve deficit. Sensory: No sensory deficit. Motor: No weakness.       Coordination: Coordination normal. Psychiatric:         Mood and Affect: Mood is anxious. Speech: Speech is rapid and pressured. Behavior: Behavior is agitated. Behavior is not aggressive. Behavior is cooperative. Thought Content: Thought content is paranoid. Thought content does not include homicidal or suicidal ideation.          DIAGNOSTIC RESULTS     RADIOLOGY:  Non-plain film images such as CT, Ultrasound and MRI are read by the radiologist. Plain radiographic images are visualized and preliminarily interpreted bythe emergency physician with the below findings:          No orders to display           LABS:  Labs Reviewed   URINE RT REFLEX TO CULTURE - Abnormal; Notable for the following components:       Result Value    Ketones, Urine 40 (*)     Protein, UA TRACE (*)     Leukocyte Esterase, Urine SMALL (*)     All other components within normal limits   URINE DRUG SCREEN - Abnormal; Notable for the following components:    Amphetamine Screen, Urine Positive (*)     Cannabinoid Scrn, Ur Positive (*)     Cocaine Metabolite Screen, Urine Positive (*)     All other components within normal limits   CBC WITH AUTO DIFFERENTIAL - Abnormal; Notable for the following components:    WBC 12.6 (*)     MCV 94.2 (*)     MCHC 32.8 (*)     Neutrophils % 75.0 (*)     Lymphocytes % 14.4 (*)     Neutrophils Absolute 9.5 (*)     Monocytes Absolute 1.20 (*)     All other components within normal limits   COMPREHENSIVE METABOLIC PANEL - Abnormal; Notable for the following components:    Glucose 116 (*)     All other components within normal limits   MICROSCOPIC URINALYSIS - Abnormal; Notable for the following components:    Bacteria, UA NEGATIVE (*)     Crystals, UA NEG (*)     Hyaline Casts, UA 23 (*)     WBC, UA 21 (*)     All other components within normal limits   CULTURE, URINE    Narrative:     ORDER#: [id number]                          ORDERED BY: TED DUMONT  SOURCE: Urine Clean Catch                  COLLECTED:  11/24/21 00: 28  ANTIBIOTICS AT [facility].:                      RECEIVED :  11/24/21 00:54   CHLAMYDIA/N. GONORRHOEAE/T. VAGINALIS, AMPLIFIED PROBE(UR)   ETHANOL   ACETAMINOPHEN LEVEL   SALICYLATE LEVEL   TSH WITH REFLEX TO FT4   RPR       All other labs were within normal range or not returned as of this dictation. EMERGENCY DEPARTMENT COURSE and DIFFERENTIAL DIAGNOSIS/MDM:   Vitals:    Vitals:    11/24/21 0016 11/24/21 0406   BP: (!) 139/96 128/76   Pulse: 121    Resp: 18    Temp: 97.8 °F (36.6 °C)    TempSrc: Oral    SpO2: 97%    Weight: 132 lb (59.9 kg)    Height: 5' 6\" (1.676 m)        MDM  41-year-old male presents with paranoia. Lab results reviewed. Urine drug screen is positive for methamphetamine, THC, and cocaine. Yue Gage [facility], evaluates patient in the emergency department. Patient is discharged to home to follow-up with [facility]. Patient is cautioned to discontinue use of substances that may affect his mind. CONSULTS:  None    PROCEDURES:  Unless otherwise noted below, none     Procedures    FINAL IMPRESSION      1. Substance use disorder          DISPOSITION/PLAN   DISPOSITION Decision To Discharge 11/24/2021 03:46:17 AM      PATIENT REFERRED TO:  [address] [address] [phone]    Follow-up with [place] [address] behavioral health or psychiatrist or psychologist.      DISCHARGE MEDICATIONS:  There are no discharge medications for this patient.          (Please note that portions of this note were completed with a voice recognition program.  Efforts were made to edit thedictations but occasionally words are mis-transcribed.)    Oj Burt MD (electronically signed)  Attending Emergency Physician          Oj Burt MD  01/08/22 [id number]

## 2023-02-10 ENCOUNTER — LAB (OUTPATIENT)
Dept: LAB | Facility: HOSPITAL | Age: 33
End: 2023-02-10

## 2023-02-10 PROCEDURE — 87635 SARS-COV-2 COVID-19 AMP PRB: CPT

## 2023-08-14 NOTE — PLAN OF CARE
Health Care Proxy (HCP) Problem: Mood - Altered:  Goal: Mood stable  12/8/2021 1622 by Eveline Fernandez  Outcome: Ongoing       Group Therapy Note     Date: 12/8/2021  Start Time: 3928  End Time:  1600  Number of Participants: 14     Type of Group: Recovery     Wellness Binder Information  Module Name:  Relapse prevention  Session Number:  4     Patient's Goal:  relapse prevention toolbox     Notes:  pt acknowledged positive coping skills as tools to help prevent relapse.      Status After Intervention:  Improved     Participation Level: Interactive     Participation Quality: Appropriate, Attentive, and Sharing        Speech:  normal        Thought Process/Content: Logical        Affective Functioning: Congruent        Mood: congruent        Level of consciousness:  Alert, Oriented x4, and Attentive        Response to Learning: Able to verbalize current knowledge/experience        Endings: None Reported     Modes of Intervention: Education        Discipline Responsible: Psychoeducational Specialist        Signature:  Eveline Fernandez